# Patient Record
Sex: MALE | Race: WHITE
[De-identification: names, ages, dates, MRNs, and addresses within clinical notes are randomized per-mention and may not be internally consistent; named-entity substitution may affect disease eponyms.]

---

## 2020-01-19 ENCOUNTER — HOSPITAL ENCOUNTER (INPATIENT)
Dept: HOSPITAL 53 - M ED | Age: 45
LOS: 41 days | Discharge: HOME | DRG: 750 | End: 2020-02-29
Attending: PSYCHIATRY & NEUROLOGY | Admitting: PSYCHIATRY & NEUROLOGY
Payer: COMMERCIAL

## 2020-01-19 VITALS — SYSTOLIC BLOOD PRESSURE: 143 MMHG | DIASTOLIC BLOOD PRESSURE: 89 MMHG

## 2020-01-19 VITALS — SYSTOLIC BLOOD PRESSURE: 138 MMHG | DIASTOLIC BLOOD PRESSURE: 95 MMHG

## 2020-01-19 VITALS — HEIGHT: 71 IN | BODY MASS INDEX: 28.43 KG/M2 | WEIGHT: 203.05 LBS

## 2020-01-19 VITALS — SYSTOLIC BLOOD PRESSURE: 140 MMHG | DIASTOLIC BLOOD PRESSURE: 86 MMHG

## 2020-01-19 DIAGNOSIS — R53.83: ICD-10-CM

## 2020-01-19 DIAGNOSIS — R73.9: ICD-10-CM

## 2020-01-19 DIAGNOSIS — R21: ICD-10-CM

## 2020-01-19 DIAGNOSIS — R33.9: ICD-10-CM

## 2020-01-19 DIAGNOSIS — Z91.14: ICD-10-CM

## 2020-01-19 DIAGNOSIS — Z91.5: ICD-10-CM

## 2020-01-19 DIAGNOSIS — Z91.19: ICD-10-CM

## 2020-01-19 DIAGNOSIS — F10.10: ICD-10-CM

## 2020-01-19 DIAGNOSIS — Z81.8: ICD-10-CM

## 2020-01-19 DIAGNOSIS — F20.2: Primary | ICD-10-CM

## 2020-01-19 DIAGNOSIS — I10: ICD-10-CM

## 2020-01-19 RX ADMIN — PALIPERIDONE SCH MG: 3 TABLET, FILM COATED, EXTENDED RELEASE ORAL at 21:39

## 2020-01-20 VITALS — SYSTOLIC BLOOD PRESSURE: 138 MMHG | DIASTOLIC BLOOD PRESSURE: 80 MMHG

## 2020-01-20 VITALS — SYSTOLIC BLOOD PRESSURE: 128 MMHG | DIASTOLIC BLOOD PRESSURE: 72 MMHG

## 2020-01-20 VITALS — DIASTOLIC BLOOD PRESSURE: 74 MMHG | SYSTOLIC BLOOD PRESSURE: 134 MMHG

## 2020-01-20 VITALS — SYSTOLIC BLOOD PRESSURE: 150 MMHG | DIASTOLIC BLOOD PRESSURE: 86 MMHG

## 2020-01-20 VITALS — SYSTOLIC BLOOD PRESSURE: 140 MMHG | DIASTOLIC BLOOD PRESSURE: 91 MMHG

## 2020-01-20 LAB
ALBUMIN SERPL BCG-MCNC: 4.2 GM/DL (ref 3.2–5.2)
ALT SERPL W P-5'-P-CCNC: 25 U/L (ref 12–78)
B-OH-BUTYR SERPL-MCNC: 4.61 MG/DL (ref ?–2.81)
BASOPHILS # BLD AUTO: 0.1 10^3/UL (ref 0–0.2)
BASOPHILS NFR BLD AUTO: 0.6 % (ref 0–1)
BILIRUB SERPL-MCNC: 0.8 MG/DL (ref 0.2–1)
BUN SERPL-MCNC: 18 MG/DL (ref 7–18)
CALCIUM SERPL-MCNC: 9 MG/DL (ref 8.5–10.1)
CHLORIDE SERPL-SCNC: 110 MEQ/L (ref 98–107)
CO2 SERPL-SCNC: 24 MEQ/L (ref 21–32)
CREAT SERPL-MCNC: 1.26 MG/DL (ref 0.7–1.3)
EOSINOPHIL # BLD AUTO: 0.1 10^3/UL (ref 0–0.5)
EOSINOPHIL NFR BLD AUTO: 0.8 % (ref 0–3)
ETHANOL SERPL-MCNC: < 0.003 % (ref 0–0.01)
FT4I SERPL CALC-MCNC: 3.9 % (ref 1.4–3.8)
GFR SERPL CREATININE-BSD FRML MDRD: > 60 ML/MIN/{1.73_M2} (ref 60–?)
GLUCOSE SERPL-MCNC: 107 MG/DL (ref 70–100)
HCT VFR BLD AUTO: 52.5 % (ref 42–52)
HGB BLD-MCNC: 16.5 G/DL (ref 13.5–17.5)
LYMPHOCYTES # BLD AUTO: 2.5 10^3/UL (ref 1.5–5)
LYMPHOCYTES NFR BLD AUTO: 22.7 % (ref 24–44)
MAGNESIUM SERPL-MCNC: 2.1 MG/DL (ref 1.8–2.4)
MCH RBC QN AUTO: 28.3 PG (ref 27–33)
MCHC RBC AUTO-ENTMCNC: 31.4 G/DL (ref 32–36.5)
MCV RBC AUTO: 90.1 FL (ref 80–96)
MONOCYTES # BLD AUTO: 0.7 10^3/UL (ref 0–0.8)
MONOCYTES NFR BLD AUTO: 6.4 % (ref 0–5)
NEUTROPHILS # BLD AUTO: 7.7 10^3/UL (ref 1.5–8.5)
NEUTROPHILS NFR BLD AUTO: 69.2 % (ref 36–66)
OSMOLALITY SERPL: 300 MOSM/KG (ref 275–295)
PLATELET # BLD AUTO: 146 10^3/UL (ref 150–450)
POTASSIUM SERPL-SCNC: 3.9 MEQ/L (ref 3.5–5.1)
PROT SERPL-MCNC: 8.1 GM/DL (ref 6.4–8.2)
RBC # BLD AUTO: 5.83 10^6/UL (ref 4.3–6.1)
SODIUM SERPL-SCNC: 144 MEQ/L (ref 136–145)
T3RU NFR SERPL: 36 % (ref 33–40)
T4 SERPL-MCNC: 10.7 UG/DL (ref 4.5–12)
WBC # BLD AUTO: 11.1 10^3/UL (ref 4–10)

## 2020-01-20 RX ADMIN — PALIPERIDONE SCH MG: 3 TABLET, FILM COATED, EXTENDED RELEASE ORAL at 09:12

## 2020-01-20 RX ADMIN — NICOTINE SCH PATCH: 21 PATCH, EXTENDED RELEASE TRANSDERMAL at 09:00

## 2020-01-20 NOTE — IPNPDOC
Text Note


Date of Service


The patient was seen on 1/20/20.





NOTE


TIME OF SERVICE: 1:30 PM





Mr. Lomeli is a 44-year-old male with a past medical history of schizophrenia, 

and depression with a previous suicide attempt who was transferred from [facility] for management of depression with suicidal ideation





SUBJECTIVE:


Per discussion with the nursing staff. The patient ate his breakfast and lunch 

very well, but has not urinated or defecated. Bladder scan revealed retention of

300 mils of urine. The patient denies having any pain or any acute problems  

this afternoon





OBJECTIVE:


GEN: well-nourished / well developed / reclined in hospital bed 


HEENT: NCAT 


CVS: RRR/NMRG


LUNGS:  lungs are clear to auscultation bilaterally on room air


ABDOMEN:  Contour ( distended) / bowel sounds are present /  the abdomen is firm

but the patient does not grimace with palpation


PSYCH: He has a flat affect and psychomotor slowing / his verbal responses slow





ASSESEMENT & PLAN:


1. Depression/suicidal ideation.


Plan: per primary.





2. Lethargy


Possible catatonia versus adverse reaction to her medication


Plan: Continue to monitor vitals /  Ativan per primary team





3. Urinary and fecal retention 


Plan: Follow up repeat bladder scan w intermittent straight cath if >300ml / 

will consider starting tamsulosin /  if he does not have a bowel movement  t

omorrow we will start a stool softerner 





4. Essential Hypertension


His target blood pressure at his age is 120/80 or less.


Plan: Because his target systolic blood pressure is greater than 20 millimeters 

mercury above the target we will start amlodipine 2.5 mg daily





5. Hyperglycemia.


Beta hydroxybutyrate and osmolality were elevated, but the tox screen was 

negative for ethyl alcohol.


Ethylene glycol is negative.


The anion gap was within normal limits


Plan: f/u A1C to r/o DM








DISPO pending course





VS,Fishbone, I+O


VS, Fishbone, I+O


Laboratory Tests


1/20/20 00:00











Vital Signs








  Date Time  Temp Pulse Resp B/P (MAP) Pulse Ox O2 Delivery O2 Flow Rate FiO2


 


1/20/20 10:37  80 17 150/86 (107) 96   


 


1/20/20 06:22 98.2       


 


1/20/20 03:53      Room Air  














I&O- Last 24 Hours up to 6 AM 


 


 1/20/20





 06:00


 


Output Total 0 ml


 


Balance 0 ml

















JANETH ALVAREZ MD                Jan 20, 2020 14:16

## 2020-01-20 NOTE — MHHPEPDOC
[facility] History & Physical


History and Physical


DATE OF ADMISSION: Jan 19, 2020 at 15:08








New Patient


Rik Pringle


MRN: N/[id number]


Date of Birth: N/A


Date of Service: 01/20/2020


Chief Complaint


"..."





History of Present Illness


The patient a 44-year-old man who was transferred from [facility], is brought

to our inpatient unit due to bizarre behavior. He was brought in initially to 

[place] due to significant bizarre actions such as going into a neighbor's house 

and becoming mute, shaking backwards and forwards, engaging in unusual behavior.

He was brought in and assessed where he was brought in for admission. He 

reportedly has a diagnosis of schizophrenia, however, he is completely catatonic

at this time and unable to engage in any meaningful interview other than repeat 

the words "I want to go home." Thus, the information below was taken from the 

chart and extracted as appropriate.





Review Of Systems


Unable to determine due to patient's significant impairment.





Past Psychiatric History


Reportedly has a history of schizophrenia with multiple admissions to [facility] in the past. Per chart appears to report that he 

has had multiple suicide attempts 5 or 6 times by stabbing.





Allergies


Please see below.





Family Psychiatric History


Reportedly has a sister and a niece that has attempted suicide, but no history 

of substance problems reported.





Social History


The patient is a never  man who is single with no children. He reports 

being employed at [employer], graduated the 10th grade. Currently living with a 

sibling by report.





Substance Abuse History


No toxicology taken, unclear if utilizes any significant substances.





Medical History


No major medical problems noted.





Mental Status Examination


General: Poor hygiene





Speech: Mute





Thought processes: Unknown





MSK: Smooth and coordinated gait, no signs of tremors or involuntary orofacial 

movements





Thought content: Unknown





Abstract reasoning, and computation: Impaired





Description of associations: Unknown





Description of abnormal or psychotic thoughts: Not available





Judgment: Impaired





Insight: Impaired





Orientation: Appears alert, but does not appear to interact significantly with 

this provider





Cognition: Impaired





Recent and remote memory: Unable to determine





Attention span and concentration: Impaired secondary to thought process versus 

cognition





Fund of knowledge: Unknown





Mood: "I want to go home"





Affect: Flat with little reactivity





Diagnoses


Schizophrenia





Catatonia





Alcohol use disorder, unspecified





Assessment and Plan


Schizophrenia: We'll discontinue Invega as likely provoking more catatonia, 

we'll continue Ativan 1 mg every 6 hours PO





Catatonia: Ativan as above. We'll continue to monitor





Alcohol use disorder: No signs of significant problems with vital signs, 

however, Ativan scheduled will cover for any alcohol withdrawal. Negative BAL on

admission to the unit





Disposition


The patient will need an admission further for a significantly impairing 

psychosis. He is unable to interact well with any provider or other in order to 

engage in basic hygiene. He'll need to continue on a 1-1 due to his balance 

problems.





Problem List


1. Altered thoughts.





Initial Treatment Plan


1. Patient was admitted on a 9.39 legal status. 


2. Complete history was obtained.


3. With patients permission, family will be contacted and database will be exp

anded. 


4. Patients medication regimen will be reviewed and changed accordingly. 


5. Patient will be provided with protected environment. 


6. Patient will be treated with individual, group, and milieu therapies. 


7. Patient will receive supportive psych-education.


8. Discharge planning will commence immediately.


9. Outpatient follow-up treatment will be strongly recommended.


10. The initial treatment plan will focus initially on:





Estimated Length Of Stay


5 days.





Time Spent


70 minutes.





Monday





Vital Signs





Vital Signs








  Date Time  Temp Pulse Resp B/P (MAP) Pulse Ox O2 Delivery O2 Flow Rate FiO2


 


1/20/20 06:22 98.2 50 18 128/72 (90)    


 


1/20/20 03:53     96 Room Air  











Laboratory Data


24H Labs


Laboratory Tests 2


1/20/20 00:00: 


Immature Granulocyte % (Auto) 0.3, Neutrophils (%) (Auto) 69.2H, Lymphocytes (%)

(Auto) 22.7L, Monocytes (%) (Auto) 6.4H, Eosinophils (%) (Auto) 0.8, Basophils 

(%) (Auto) 0.6, Neutrophils # (Auto) 7.7, Lymphocytes # (Auto) 2.5, Monocytes # 

(Auto) 0.7, Eosinophils # (Auto) 0.1, Basophils # (Auto) 0.1, Nucleated Red 

Blood Cells % (auto) 0.0, Anion Gap 10, Glomerular Filtration Rate > 60.0, 

Osmolality 300H, Lactic Acid Level 1.5, Calcium Level 9.0, Magnesium Level 2.1, 

Total Bilirubin 0.8, Aspartate Amino Transf (AST/SGOT) 12, Alanine 

Aminotransferase (ALT/SGPT) 25, Alkaline Phosphatase 114, Total Protein 8.1, 

Albumin 4.2, Albumin/Globulin Ratio 1.08, Thyroid Stimulating Hormone (TSH) 

5.110H, Free Thyroxine Index 3.9H, Thyroxine (T4) 10.7, Triiodothyronine (T3) 

Uptake 36, Ethyl Alcohol Level < 0.003, B-Hydroxybutyrate 4.61H


CBC/BMP


Laboratory Tests


1/20/20 00:00











Medications


No Active Prescriptions or Reported Meds





Allergies


Coded Allergies:  


     No Known Allergies (Unverified , 1/19/20)











FRANCESCO GERMAIN DO              Jan 20, 2020 10:22

## 2020-01-20 NOTE — HPEPDOC
General


Date of Admission


Jan 19, 2020 at 15:08


Date of Service:  Jan 19, 2020


Chief Complaint


The patient is a 44-year-old male Who presented to [facility] as a transfer from [facility] for suicidal thoughts.





History of Present Illness


Patient is a 44 year old  male with a PMHX of Schizophrenia, 

Depression, Hx of Suicidal attempt (Reported to be with Antifreeze consumption) 

who presented to the [facility] ER as a transfer from [facility] after he was found 

to have suicidal thoughts.


 


Patient presented to [facility] and reported he had a sick mind and had thoughts / 

voices of self-harm. He noted to staff there that he wanted to kill himself with

knives. He was evaluated by medicine there and was transferred to our facility 

for psychiatric inpatient hospitalization. 


 


I was called to evaluate the patient after staff reported that he was noted to 

have possible lethargy and catatonia. 


 


Upon evaluation of the patient reported that he still has thoughts of self-harm,

but had no specific complaints. Patient appears to be very poor historian. The 

details of his story do not match what has transpired prior to his arrival.


 


He denied any chest pain, SOB, palpitations, cough, N/V, abdominal pain, C/D or 

dysuria. Denied fevers/chills.





Home Medications


No Active Prescriptions or Reported Meds





Allergies


Coded Allergies:  


     No Known Allergies (Unverified , 1/19/20)





Past Medical History


Medical History


Schizophrenia, Depression, Hx of Suicidal attempt (Reported to be with 

Antifreeze consumption)


Surgical History


Patient has denied any prior surgeries; however his history is unverifiable





Family History


- Patient has denied any prior family history; however again, this remains 

unverifiable





Social History


- Denies the use of alcohol, tobacco or illicit drugs


- Transfer from [facility] for psychosis and suicidal ideation





Review of Systems


Other systems


10 point review of systems complete, all negative otherwise stated in HPI





Vital Signs


- Vitals: /86, HR 64, RR 16, Sat 95%RA, Temp 97.8F


- General: Lying in bed, Limited speech Awake / Alert / Oriented to person only,

Flat affect, No eye contact


- HEENT: NC, AT, PERRLA


- CVS: RRR, +S1S2, - Murmurs / rubs / gallops


- Lungs: Fair air entry bilaterally, No appreciable wheezing / rales / rhonchi 


- Abdomen: Soft, Non-distended, Non-tender, 


- Extremities: No lower extremity edema, No calf tenderness


- Neuro: No focal motor or sensory deficit


- Skin: No visible rashes





Laboratory Data


Labs 24H


Laboratory Tests 2


1/20/20 00:00: 


Immature Granulocyte % (Auto) 0.3, Neutrophils (%) (Auto) 69.2H, Lymphocytes (%)

(Auto) 22.7L, Monocytes (%) (Auto) 6.4H, Eosinophils (%) (Auto) 0.8, Basophils 

(%) (Auto) 0.6, Neutrophils # (Auto) 7.7, Lymphocytes # (Auto) 2.5, Monocytes # 

(Auto) 0.7, Eosinophils # (Auto) 0.1, Basophils # (Auto) 0.1, Nucleated Red 

Blood Cells % (auto) 0.0, Anion Gap 10, Glomerular Filtration Rate > 60.0, 

Osmolality 300H, Lactic Acid Level 1.5, Calcium Level 9.0, Magnesium Level 2.1, 

Total Bilirubin 0.8, Aspartate Amino Transf (AST/SGOT) 12, Alanine 

Aminotransferase (ALT/SGPT) 25, Alkaline Phosphatase 114, Total Protein 8.1, 

Albumin 4.2, Albumin/Globulin Ratio 1.08, Thyroid Stimulating Hormone (TSH) 

5.110H, Free Thyroxine Index 3.9H, Thyroxine (T4) 10.7, Triiodothyronine (T3) 

Uptake 36, Ethyl Alcohol Level < 0.003, B-Hydroxybutyrate 4.61H


CBC/BMP


Laboratory Tests


1/20/20 00:00











Plan / VTE


VTE Prophylaxis Ordered?:  Yes





Plan


Plan


Suicidal ideation / Schizophrenia 


- Patient presented as a transfer from [facility] for suicidal ideatio

n/psychosis


- Patient was evaluated by medical personnel at [facility] and cleared for transfer to

some [facility]


- Patient was subsequently brought to [facility] ER and admitted to [facility] by Dr. Main


- This is currently being managed by psychiatry





Lethargy - possibly 2/2 medications received, possibly 2/2 consumption of other 


- As called by [facility] you to evaluate patient for lethargy/catatonia


- Currently patient appears to be hemodynamically stable and afebrile


- Patient is not fully oriented; remains oriented only to person


- Lab work completed at [facility] did reveal an elevation of his anion gap (21), 

suppression of bicarbonate (19), mild elevation of WBC (11. 7), osmolality 

(289), UA was negative for infection - but did reveal 3+ Ketones 


- Will repeat lab work here today; CBC / CMP / Mg / Osmolality / Lactic acid / 

Ethylene glycol / Acetate / Ketones / Ethanol 





DVT prophylaxis 


- Will c/w early ambulation 





Chaperone was present for the duration of his history and physical examination





Will continue to follow











PINO NAJERA MD                Jan 20, 2020 01:48

## 2020-01-21 VITALS — SYSTOLIC BLOOD PRESSURE: 146 MMHG | DIASTOLIC BLOOD PRESSURE: 88 MMHG

## 2020-01-21 VITALS — DIASTOLIC BLOOD PRESSURE: 88 MMHG | SYSTOLIC BLOOD PRESSURE: 135 MMHG

## 2020-01-21 VITALS — SYSTOLIC BLOOD PRESSURE: 142 MMHG | DIASTOLIC BLOOD PRESSURE: 90 MMHG

## 2020-01-21 VITALS — DIASTOLIC BLOOD PRESSURE: 65 MMHG | SYSTOLIC BLOOD PRESSURE: 105 MMHG

## 2020-01-21 LAB — EST. AVERAGE GLUCOSE BLD GHB EST-MCNC: 103 MG/DL (ref 60–110)

## 2020-01-21 RX ADMIN — NICOTINE SCH PATCH: 21 PATCH, EXTENDED RELEASE TRANSDERMAL at 08:31

## 2020-01-21 NOTE — MHIPNPDOC
[facility] Progress Note


Progress Note








Inpatient Progress Note


Rik Pringle


MRN: N/A


Date of Birth: N/A


Date of Service: 01/21/2020


History of Present Illness


Patient presented catatonic with a history of schizophrenia and significant 

mental health transfer from [place].





Interval History


Psychiatric symptoms today:





Affective: Unable to determine.





Psychotic: Significant catatonic behavior with strange emotions, has less 

problems with eating more oral intake than previous. Converses in more than 1 

sentence at a time.





Anxiety: Unable to screen.





Misc: Still disorganized behavior.





Group Attendance: No groups gone to today.





Behavioral problems/significant events overnight: The patient reports he wants 

to go "home," however he requires a sitter due to his balance. He has had no 

falls but continues to be disorganized.





Staff Report: The patient continues to be disorganized, but has been improving 

in terms of his catatonic behavior. He still says only a few words at a time, 

but appears slightly more interactive.





Review Of Systems


Unable to determine due to patient's mental status.





Psychotherapy


None on this visit.





Vital Signs


Reviewed.





Mental Status Examination


General: Fair hygiene





Speech: Nearly mute





Thought processes: Appears linear





MSK: Psychomotor retardation





Thought content: Unknown





Abstract reasoning, and computation: Appears impaired





Description of associations: Appears impaired





Description of abnormal or psychotic thoughts: Unknown





Judgment: impaired





Insight: impaired





Orientation: Appears alert and able to interact





Cognition: Slowed





Recent and remote memory: Impaired





Attention span and concentration: Impaired





Fund of knowledge: Unknown





Mood: "I want to go home"





Affect: Profoundly flat and dysthymic





Diagnoses


Schizophrenia.





Catatonia.





Alcohol use disorder, unspecified.





Assessment and Plan


Schizophrenia: Will continue to hold off on neuroleptics until catatonia 

resolves.





Catatonia: Continue Ativan.





Alcohol use disorder: Will continue Ativan for catatonia. Continue to screen for

any dysregulation consistent with alcohol withdrawal, as unknown amount of 

alcohol use prior.





Disposition


The patient will need a further inpatient admission due to his severe impairing 

psychosis and catatonia, making him unable to care for himself.





Time Spent


15 minutes.





Tuesday





Vital Signs





Vital Signs








  Date Time  Temp Pulse Resp B/P (MAP) Pulse Ox O2 Delivery O2 Flow Rate FiO2


 


1/21/20 08:30  84  140/90    


 


1/21/20 06:44 97.3  14  95 Room Air  











Current Medications





Current Medications








 Medications


  (Trade)  Dose


 Ordered  Sig/Nimo


 Route


 PRN Reason  Start Time


 Stop Time Status Last Admin


Dose Admin


 


 Acetaminophen


  (Tylenol Tab)  650 mg  Q6HP  PRN


 PO


 HEADACHE or DISCOMFORT  1/19/20 15:15


     





 


 Al Hydrox/Mg


 Hydrox/Simethicone


  (Mylanta)  30 ml  Q4HP  PRN


 PO


 HEARTBURN/INDIGESTION  1/19/20 15:15


     





 


 Amlodipine


 Besylate


  (Norvasc)  2.5 mg  DAILY


 PO


   1/20/20 09:00


 1/21/20 08:37 DC 1/21/20 08:30





 


 Amlodipine


 Besylate


  (Norvasc)  5 mg  DAILY


 PO


   1/22/20 09:00


     





 


 Home Med


  (Med Rec


 Complete!)    ASDIRECTED


 XX


   1/19/20 12:45


 1/19/20 12:44 DC  





 


 Lorazepam


  (Ativan)  0.5 mg  TID


 PO


   1/20/20 16:00


 1/20/20 13:08 DC  





 


 Lorazepam


  (Ativan)  1 mg  Q6H


 PO


   1/19/20 22:00


 1/20/20 13:09 DC 1/20/20 10:33





 


 Lorazepam


  (Ativan)  1 mg  Q6H


 PO


   1/20/20 18:00


    1/21/20 06:05





 


 Magnesium


 Hydroxide


  (Milk Of


 Magnesia)  30 ml  DAILYPRN  PRN


 PO


 CONSTIPATION  1/19/20 15:15


     





 


 Nicotine


  (Nicoderm Cq


 21mg)  1 patch  DAILY


 TD


   1/20/20 09:00


     





 


 Olanzapine


  (ZyPREXA


 **ZYDIS**)  10 mg  TIDP  PRN


 PO


 ANXIETY/AGITATION  1/19/20 15:00


    1/19/20 21:39





 


 Paliperidone


  (Invega)  3 mg  BID


 PO


   1/19/20 21:00


 1/20/20 12:58 DC 1/20/20 09:12





 


 Trazodone HCl


  (Desyrel)  50 mg  QHSP  PRN


 PO


 INSOMNIA  1/19/20 21:00


    1/19/20 21:39














Allergies


Coded Allergies:  


     No Known Allergies (Unverified , 1/19/20)











FRANCESCO GERMAIN DO              Jan 21, 2020 09:27

## 2020-01-21 NOTE — IPNPDOC
Text Note


Date of Service


The patient was seen on 1/21/20.





NOTE


TIME OF SERVICE: 1205 PM





Mr. Lomeli is a 44-year-old male with a past medical history of schizophrenia, 

and depression with a previous suicide attempt who was transferred from [facility] for management of depression with suicidal ideation; we were consulted 

to comanage his medical problems





SUBJECTIVE:


Per discussion with the nursing staff , the patient voided and his catatonia is 

resolving. Currently the patient denies having any acute complaints





OBJECTIVE:


GEN: well-nourished / well developed / reclined in hospital bed 


HEENT: NCAT 


PSYCH: He has a flat affect and psychomotor slowing is less prominent today than

it was yesterday / his verbal response is not as low as it was yesterday





ASSESEMENT & PLAN:


1. Depression/suicidal ideation.


Plan: per primary.





2. Catatonia- resolving


Plan:  per primary team





3. Urinary and fecal retention - resolving





4. Essential Hypertension


His blood pressure still suboptimally controlled


Plan: Increase amlodipine to 5 mg daily





5. Transient Hyperglycemia.


His A1c was 5.2





Thank you for consulting us, we will sign off, please don't hesitate to contact 

us if new issues arise.





VS,Fishbone, I+O


VS, Fishbone, I+O





Vital Signs








  Date Time  Temp Pulse Resp B/P (MAP) Pulse Ox O2 Delivery O2 Flow Rate FiO2


 


1/21/20 12:00  80 16 135/88 (104) 98   


 


1/21/20 06:44 97.3     Room Air  














I&O- Last 24 Hours up to 6 AM 


 


 1/21/20





 06:00


 


Output Total 1050 ml


 


Balance -1050 ml

















JANETH ALVAREZ MD                Jan 21, 2020 14:58

## 2020-01-22 VITALS — DIASTOLIC BLOOD PRESSURE: 59 MMHG | SYSTOLIC BLOOD PRESSURE: 118 MMHG

## 2020-01-22 VITALS — SYSTOLIC BLOOD PRESSURE: 141 MMHG | DIASTOLIC BLOOD PRESSURE: 80 MMHG

## 2020-01-22 VITALS — DIASTOLIC BLOOD PRESSURE: 73 MMHG | SYSTOLIC BLOOD PRESSURE: 133 MMHG

## 2020-01-22 VITALS — SYSTOLIC BLOOD PRESSURE: 133 MMHG | DIASTOLIC BLOOD PRESSURE: 73 MMHG

## 2020-01-22 VITALS — SYSTOLIC BLOOD PRESSURE: 125 MMHG | DIASTOLIC BLOOD PRESSURE: 68 MMHG

## 2020-01-22 RX ADMIN — NICOTINE SCH PATCH: 21 PATCH, EXTENDED RELEASE TRANSDERMAL at 08:21

## 2020-01-22 NOTE — MHIPNPDOC
[facility] Progress Note


Progress Note








Inpatient Progress Note


Rik Pringle


MRN: N/A


Date of Birth: N/A


Date of Service: 01/22/2020


History of Present Illness


Patient presented catatonic with a history of schizophrenia and significant 

mental health transfer from home.





Interval History


Psychiatric symptoms today:





Affective: Unable to determine.





Psychotic: Still psychotic behavior in the terms of disorganization, inability 

to care for self, no bathing, does take significant prompting to eat.





Anxiety: Unable to screen.





Misc: Still disorganized behavior.





Group Attendance: No groups gone to today.





Behavioral problems/significant events overnight: The patient reports he wants 

to go "home," however he requires a sitter due to his balance. He has had no 

falls but continues to be disorganized.





Staff Report: The patient continues to be catatonic and unable to care for 

himself. His disorganization makes it difficult to coordinate feeding.





Review Of Systems


General: Denies fever or appetite changes 





Cardiovascular: Denies Chest pain or palpations





GI: Denies Nausea, vomiting, or bowel changes





Respiratory: Denies shortness of breath or cough





Neuro: Denies dizziness, tremors





Derm: Denies any rashes or pruritus





: Denies any dysuria or urinary problems 





MSK: Denies any muscle tightness or stiffness





HEENT: Denies any vision changes or headaches





Heme/Lymph: denies any bruising or bleeding





Endo: denies any cold/heat intolerance or water intake changes





Psychotherapy


None on this visit.





Vital Signs


Reviewed.





Mental Status Examination


General: Fair hygiene





Speech: Nearly mute





Thought processes: Appears linear





MSK: Psychomotor retardation





Thought content: Unknown





Abstract reasoning, and computation: Appears impaired





Description of associations: Appears impaired





Description of abnormal or psychotic thoughts: Unknown





Judgment: impaired





Insight: impaired





Orientation: Appears alert and able to interact





Cognition: Slowed





Recent and remote memory: Impaired





Attention span and concentration: Impaired





Fund of knowledge: Unknown





Mood: "I want to go home"





Affect: Profoundly flat and dysthymic





Diagnoses


Schizophrenia.





Catatonia.





Alcohol use disorder, unspecified.





Assessment and Plan


Schizophrenia: Will try a small trial of Clozaril 25 mg with spacing for Ativan.





Catatonia: Continue Ativan.





Alcohol use disorder: Will continue Ativan for catatonia. Continue to screen for

any dysregulation consistent with alcohol withdrawal, as unknown amount of 

alcohol use prior.





Disposition


The patient will need a further inpatient admission due to his severe impairing 

psychosis and catatonia, making him unable to care for himself.





Time Spent


15 minutes.





Wednesday





Vital Signs





Vital Signs








  Date Time  Temp Pulse Resp B/P (MAP) Pulse Ox O2 Delivery O2 Flow Rate FiO2


 


1/22/20 06:02 97.9 62 16 141/80 (100)    


 


1/22/20 00:14     100 Room Air  











Current Medications





Current Medications








 Medications


  (Trade)  Dose


 Ordered  Sig/Nimo


 Route


 PRN Reason  Start Time


 Stop Time Status Last Admin


Dose Admin


 


 Acetaminophen


  (Tylenol Tab)  650 mg  Q6HP  PRN


 PO


 HEADACHE or DISCOMFORT  1/19/20 15:15


     





 


 Al Hydrox/Mg


 Hydrox/Simethicone


  (Mylanta)  30 ml  Q4HP  PRN


 PO


 HEARTBURN/INDIGESTION  1/19/20 15:15


     





 


 Amlodipine


 Besylate


  (Norvasc)  2.5 mg  DAILY


 PO


   1/20/20 09:00


 1/21/20 08:37 DC 1/21/20 08:30





 


 Amlodipine


 Besylate


  (Norvasc)  5 mg  DAILY


 PO


   1/22/20 09:00


     





 


 Home Med


  (Med Rec


 Complete!)    ASDIRECTED


 XX


   1/19/20 12:45


 1/19/20 12:44 DC  





 


 Lorazepam


  (Ativan)  0.5 mg  TID


 PO


   1/20/20 16:00


 1/20/20 13:08 DC  





 


 Lorazepam


  (Ativan)  1 mg  Q6H


 PO


   1/19/20 22:00


 1/20/20 13:09 DC 1/20/20 10:33





 


 Lorazepam


  (Ativan)  1 mg  Q6H


 PO


   1/20/20 18:00


    1/22/20 05:51





 


 Magnesium


 Hydroxide


  (Milk Of


 Magnesia)  30 ml  DAILYPRN  PRN


 PO


 CONSTIPATION  1/19/20 15:15


     





 


 Nicotine


  (Nicoderm Cq


 21mg)  1 patch  DAILY


 TD


   1/20/20 09:00


     





 


 Olanzapine


  (ZyPREXA


 **ZYDIS**)  10 mg  TIDP  PRN


 PO


 ANXIETY/AGITATION  1/19/20 15:00


 1/21/20 15:16 DC 1/19/20 21:39





 


 Paliperidone


  (Invega)  3 mg  BID


 PO


   1/19/20 21:00


 1/20/20 12:58 DC 1/20/20 09:12





 


 Trazodone HCl


  (Desyrel)  50 mg  QHSP  PRN


 PO


 INSOMNIA  1/19/20 21:00


    1/19/20 21:39














Allergies


Coded Allergies:  


     No Known Allergies (Unverified , 1/19/20)











FRANCESCO GERMAIN DO              Jan 22, 2020 07:27

## 2020-01-23 VITALS — SYSTOLIC BLOOD PRESSURE: 110 MMHG | DIASTOLIC BLOOD PRESSURE: 72 MMHG

## 2020-01-23 VITALS — SYSTOLIC BLOOD PRESSURE: 136 MMHG | DIASTOLIC BLOOD PRESSURE: 76 MMHG

## 2020-01-23 VITALS — SYSTOLIC BLOOD PRESSURE: 103 MMHG | DIASTOLIC BLOOD PRESSURE: 55 MMHG

## 2020-01-23 VITALS — DIASTOLIC BLOOD PRESSURE: 87 MMHG | SYSTOLIC BLOOD PRESSURE: 138 MMHG

## 2020-01-23 LAB
ALBUMIN SERPL BCG-MCNC: 3.3 GM/DL (ref 3.2–5.2)
ALT SERPL W P-5'-P-CCNC: 36 U/L (ref 12–78)
BILIRUB SERPL-MCNC: 0.6 MG/DL (ref 0.2–1)
BUN SERPL-MCNC: 13 MG/DL (ref 7–18)
CALCIUM SERPL-MCNC: 8.3 MG/DL (ref 8.5–10.1)
CHLORIDE SERPL-SCNC: 112 MEQ/L (ref 98–107)
CO2 SERPL-SCNC: 24 MEQ/L (ref 21–32)
CREAT SERPL-MCNC: 1.01 MG/DL (ref 0.7–1.3)
GFR SERPL CREATININE-BSD FRML MDRD: > 60 ML/MIN/{1.73_M2} (ref 60–?)
GLUCOSE SERPL-MCNC: 75 MG/DL (ref 70–100)
POTASSIUM SERPL-SCNC: 4.1 MEQ/L (ref 3.5–5.1)
PROT SERPL-MCNC: 6.6 GM/DL (ref 6.4–8.2)
SODIUM SERPL-SCNC: 143 MEQ/L (ref 136–145)

## 2020-01-23 RX ADMIN — NICOTINE SCH PATCH: 21 PATCH, EXTENDED RELEASE TRANSDERMAL at 09:00

## 2020-01-23 NOTE — MHIPNPDOC
[facility] Progress Note


Progress Note








Inpatient Progress Note


Rik Pringle


MRN: N/A


Date of Birth: N/A


Date of Service: 01/23/2020


History of Present Illness


Patient presented catatonic with a history of schizophrenia and significant 

mental health transfer from home.





Interval History


Psychiatric symptoms today:





Affective: Unable to determine.





Psychotic: The patient is still having significant psychotic behavior, appears 

to be more catatonic, more frozen and repeating words, unable to engage in basic

behaviors.





Anxiety: Unable to screen.





Misc: Still disorganized behavior.





Group Attendance: No groups gone to today.





Behavioral problems/significant events overnight: The patient reports he wants 

to go "home," however he requires a sitter due to his balance. He has had no 

falls but continues to be disorganized.





Staff Report: The patient continues to be catatonic and unable to care for 

himself. His disorganization makes it difficult to coordinate feeding.





Review Of Systems


Unable to obtain due to mental status.





Psychotherapy


None on this visit.





Vital Signs


Reviewed.





Mental Status Examination


General: Poor hygiene





Speech: Nearly mute





Thought processes: Appears linear





MSK: Psychomotor retardation





Thought content: Unknown





Abstract reasoning, and computation: Appears impaired





Description of associations: Appears impaired





Description of abnormal or psychotic thoughts: Unknown





Judgment: impaired





Insight: impaired





Orientation: Appears alert and able to interact





Cognition: Slowed





Recent and remote memory: Impaired





Attention span and concentration: Impaired





Fund of knowledge: Unknown





Mood: "I want to go home"





Affect: Profoundly flat and dysthymic





Diagnoses


Schizophrenia.





Catatonia.





Alcohol use disorder, unspecified.





Assessment and Plan


Schizophrenia: Discontinue Clozaril 25 mg as appears to make patient worse. 

Continue Ativan.





Catatonia: Will do CMP which appears normal and urinalysis to monitor for rhabdo

and other problems consistent with catatonia and starvation.





Alcohol use disorder: Will continue Ativan for catatonia. Continue to screen for

any dysregulation consistent with alcohol withdrawal, as unknown amount of 

alcohol use prior.





Disposition


The patient will need a further inpatient admission due to his severe impairing 

psychosis and catatonia, making him unable to care for himself.





Time Spent


15 minutes.





Thursday





Vital Signs





Vital Signs








  Date Time  Temp Pulse Resp B/P (MAP) Pulse Ox O2 Delivery O2 Flow Rate FiO2


 


1/23/20 09:36  66 16 110/72 (85) 97   


 


1/23/20 06:31 97.5     Room Air  











Current Medications





Current Medications








 Medications


  (Trade)  Dose


 Ordered  Sig/Nimo


 Route


 PRN Reason  Start Time


 Stop Time Status Last Admin


Dose Admin


 


 Acetaminophen


  (Tylenol Tab)  650 mg  Q6HP  PRN


 PO


 HEADACHE or DISCOMFORT  1/19/20 15:15


     





 


 Al Hydrox/Mg


 Hydrox/Simethicone


  (Mylanta)  30 ml  Q4HP  PRN


 PO


 HEARTBURN/INDIGESTION  1/19/20 15:15


     





 


 Amlodipine


 Besylate


  (Norvasc)  2.5 mg  DAILY


 PO


   1/20/20 09:00


 1/21/20 08:37 DC 1/21/20 08:30





 


 Amlodipine


 Besylate


  (Norvasc)  5 mg  DAILY


 PO


   1/22/20 09:00


    1/22/20 08:20





 


 Clozapine


  (Clozaril)  25 mg  QHS


 PO


   1/22/20 21:00


    1/22/20 21:33





 


 Home Med


  (Med Rec


 Complete!)    ASDIRECTED


 XX


   1/19/20 12:45


 1/19/20 12:44 DC  





 


 Lorazepam


  (Ativan)  0.5 mg  TID


 PO


   1/20/20 16:00


 1/20/20 13:08 DC  





 


 Lorazepam


  (Ativan)  1 mg  Q6H


 PO


   1/19/20 22:00


 1/20/20 13:09 DC 1/20/20 10:33





 


 Lorazepam


  (Ativan)  1 mg  Q6H


 PO


   1/20/20 18:00


 1/22/20 08:26 DC 1/22/20 05:51





 


 Lorazepam


  (Ativan)  1 mg  TID


 PO


   1/22/20 16:00


    1/23/20 09:38





 


 Magnesium


 Hydroxide


  (Milk Of


 Magnesia)  30 ml  DAILYPRN  PRN


 PO


 CONSTIPATION  1/19/20 15:15


     





 


 Nicotine


  (Nicoderm Cq


 21mg)  1 patch  DAILY


 TD


   1/20/20 09:00


     





 


 Olanzapine


  (ZyPREXA


 **ZYDIS**)  10 mg  TIDP  PRN


 PO


 ANXIETY/AGITATION  1/19/20 15:00


 1/21/20 15:16 DC 1/19/20 21:39





 


 Paliperidone


  (Invega)  3 mg  BID


 PO


   1/19/20 21:00


 1/20/20 12:58 DC 1/20/20 09:12





 


 Trazodone HCl


  (Desyrel)  50 mg  QHSP  PRN


 PO


 INSOMNIA  1/19/20 21:00


    1/19/20 21:39














Allergies


Coded Allergies:  


     No Known Allergies (Unverified , 1/19/20)











FRANCESCO GERMAIN DO              Jan 23, 2020 10:53

## 2020-01-24 VITALS — DIASTOLIC BLOOD PRESSURE: 78 MMHG | SYSTOLIC BLOOD PRESSURE: 134 MMHG

## 2020-01-24 VITALS — DIASTOLIC BLOOD PRESSURE: 64 MMHG | SYSTOLIC BLOOD PRESSURE: 115 MMHG

## 2020-01-24 VITALS — SYSTOLIC BLOOD PRESSURE: 132 MMHG | DIASTOLIC BLOOD PRESSURE: 77 MMHG

## 2020-01-24 LAB
APPEARANCE UR: CLEAR
BACTERIA UR QL AUTO: NEGATIVE
BILIRUB UR QL STRIP.AUTO: NEGATIVE
GLUCOSE UR QL STRIP.AUTO: NEGATIVE MG/DL
HGB UR QL STRIP.AUTO: NEGATIVE
KETONES UR QL STRIP.AUTO: NEGATIVE MG/DL
LEUKOCYTE ESTERASE UR QL STRIP.AUTO: NEGATIVE
MUCOUS THREADS URNS QL MICRO: (no result)
NITRITE UR QL STRIP.AUTO: NEGATIVE
PH UR STRIP.AUTO: 6 UNITS (ref 5–9)
PROT UR QL STRIP.AUTO: NEGATIVE MG/DL
RBC # UR AUTO: 2 /HPF (ref 0–3)
SP GR UR STRIP.AUTO: 1.02 (ref 1–1.03)
SQUAMOUS #/AREA URNS AUTO: 0 /HPF (ref 0–6)
UROBILINOGEN UR QL STRIP.AUTO: 2 MG/DL (ref 0–2)
WBC #/AREA URNS AUTO: 1 /HPF (ref 0–3)

## 2020-01-24 RX ADMIN — NICOTINE SCH PATCH: 21 PATCH, EXTENDED RELEASE TRANSDERMAL at 09:00

## 2020-01-24 NOTE — MHIPNPDOC
[facility] Progress Note


Progress Note








Inpatient Progress Note


Rik Pringle


MRN: N/A


Date of Birth: N/A


Date of Service: 01/24/2020


History of Present Illness


Patient presented catatonic with a history of schizophrenia and significant 

mental health transfer from home.





Interval History


Psychiatric symptoms today: The patient makes no effort to respond other than 

saying "I want to go home," stares off blankly, nearly mute.





Affective: Unable to determine.





Psychotic: The patient still having very disorganized behavior but has made some

improvements in showering, has been eating more, although repeats various words,

is generally negativistic.





Anxiety: Unable to screen.





Misc: Still has some disorganized behavior, unable to coordinate without 

significant prompting, basic hygiene.





Group Attendance: No groups gone to today. Patient was able to urinate and was 

more directable. 





Behavioral problems/significant events overnight: None reported.





Staff Report: The patient is making some improvements in terms of basic hygiene 

and feeding but still requires significant prompting.





Review Of Systems


Unable to obtain due to mental status.





Psychotherapy


None on this visit.





Vital Signs


Reviewed.





Mental Status Examination


General: Poor hygiene





Speech: Nearly mute





Thought processes: Appears linear





MSK: Psychomotor retardation





Thought content: Unknown





Abstract reasoning, and computation: Appears impaired





Description of associations: Appears impaired





Description of abnormal or psychotic thoughts: Unknown





Judgment: impaired





Insight: impaired





Orientation: Appears alert and able to interact





Cognition: Slowed





Recent and remote memory: Impaired





Attention span and concentration: Impaired





Fund of knowledge: Unknown





Mood: "I want to go home"





Affect: Profoundly flat and dysthymic





Diagnoses


Schizophrenia.





Catatonia.





Alcohol use disorder, unspecified.





Assessment and Plan


Schizophrenia: Continue to hold off on neuroleptics. Patient making progress 

with catatonia treatment.





Catatonia: CMP and urinalysis unremarkable. We will continue to monitor patient 

feeding more.





Alcohol use disorder: Will continue Ativan for catatonia. Continue to screen for

any dysregulation consistent with alcohol withdrawal, as unknown amount of 

alcohol use prior.





Disposition


The patient will need a further inpatient admission due to his severe impairing 

psychosis and catatonia, making him unable to care for himself.





Time Spent


15 minutes.





Friday





Vital Signs





Vital Signs








  Date Time  Temp Pulse Resp B/P (MAP) Pulse Ox O2 Delivery O2 Flow Rate FiO2


 


1/24/20 09:00  89  140/72    


 


1/24/20 07:05 98.1  14     


 


1/23/20 15:51     97   


 


1/23/20 06:31      Room Air  











Laboratory Data


[facility] Labs


Laboratory Tests 2


1/23/20 11:14: 


Anion Gap 7L, Glomerular Filtration Rate > 60.0, Calcium Level 8.3L, Total 

Bilirubin 0.6, Aspartate Amino Transf (AST/SGOT) 17, Alanine Aminotransferase 

(ALT/SGPT) 36, Alkaline Phosphatase 97, Total Protein 6.6, Albumin 3.3, 

Albumin/Globulin Ratio 1.00


CBC/BMP


Laboratory Tests


1/23/20 11:14











Current Medications





Current Medications








 Medications


  (Trade)  Dose


 Ordered  Sig/Nimo


 Route


 PRN Reason  Start Time


 Stop Time Status Last Admin


Dose Admin


 


 Acetaminophen


  (Tylenol Tab)  650 mg  Q6HP  PRN


 PO


 HEADACHE or DISCOMFORT  1/19/20 15:15


     





 


 Al Hydrox/Mg


 Hydrox/Simethicone


  (Mylanta)  30 ml  Q4HP  PRN


 PO


 HEARTBURN/INDIGESTION  1/19/20 15:15


     





 


 Amlodipine


 Besylate


  (Norvasc)  2.5 mg  DAILY


 PO


   1/20/20 09:00


 1/21/20 08:37 DC 1/21/20 08:30





 


 Amlodipine


 Besylate


  (Norvasc)  5 mg  DAILY


 PO


   1/22/20 09:00


    1/22/20 08:20





 


 Clozapine


  (Clozaril)  25 mg  QHS


 PO


   1/22/20 21:00


 1/23/20 12:16 DC 1/22/20 21:33





 


 Home Med


  (Med Rec


 Complete!)    ASDIRECTED


 XX


   1/19/20 12:45


 1/19/20 12:44 DC  





 


 Lorazepam


  (Ativan)  0.5 mg  TID


 PO


   1/20/20 16:00


 1/20/20 13:08 DC  





 


 Lorazepam


  (Ativan)  1 mg  Q6H


 PO


   1/19/20 22:00


 1/20/20 13:09 DC 1/20/20 10:33





 


 Lorazepam


  (Ativan)  1 mg  Q6H


 PO


   1/20/20 18:00


 1/22/20 08:26 DC 1/22/20 05:51





 


 Lorazepam


  (Ativan)  1 mg  TID


 PO


   1/22/20 16:00


    1/23/20 09:38





 


 Magnesium


 Hydroxide


  (Milk Of


 Magnesia)  30 ml  DAILYPRN  PRN


 PO


 CONSTIPATION  1/19/20 15:15


     





 


 Nicotine


  (Nicoderm Cq


 21mg)  1 patch  DAILY


 TD


   1/20/20 09:00


     





 


 Olanzapine


  (ZyPREXA


 **ZYDIS**)  10 mg  TIDP  PRN


 PO


 ANXIETY/AGITATION  1/19/20 15:00


 1/21/20 15:16 DC 1/19/20 21:39





 


 Paliperidone


  (Invega)  3 mg  BID


 PO


   1/19/20 21:00


 1/20/20 12:58 DC 1/20/20 09:12





 


 Trazodone HCl


  (Desyrel)  50 mg  QHSP  PRN


 PO


 INSOMNIA  1/19/20 21:00


    1/19/20 21:39














Allergies


Coded Allergies:  


     No Known Allergies (Unverified , 1/19/20)











FRANCESCO GERMAIN DO              Jan 24, 2020 11:05

## 2020-01-25 VITALS — DIASTOLIC BLOOD PRESSURE: 69 MMHG | SYSTOLIC BLOOD PRESSURE: 128 MMHG

## 2020-01-25 VITALS — DIASTOLIC BLOOD PRESSURE: 67 MMHG | SYSTOLIC BLOOD PRESSURE: 124 MMHG

## 2020-01-25 VITALS — SYSTOLIC BLOOD PRESSURE: 125 MMHG | DIASTOLIC BLOOD PRESSURE: 62 MMHG

## 2020-01-25 RX ADMIN — NICOTINE SCH PATCH: 21 PATCH, EXTENDED RELEASE TRANSDERMAL at 09:00

## 2020-01-26 VITALS — DIASTOLIC BLOOD PRESSURE: 80 MMHG | SYSTOLIC BLOOD PRESSURE: 123 MMHG

## 2020-01-26 VITALS — SYSTOLIC BLOOD PRESSURE: 121 MMHG | DIASTOLIC BLOOD PRESSURE: 65 MMHG

## 2020-01-26 VITALS — SYSTOLIC BLOOD PRESSURE: 125 MMHG | DIASTOLIC BLOOD PRESSURE: 79 MMHG

## 2020-01-26 RX ADMIN — NICOTINE SCH PATCH: 21 PATCH, EXTENDED RELEASE TRANSDERMAL at 09:00

## 2020-01-27 VITALS — DIASTOLIC BLOOD PRESSURE: 59 MMHG | SYSTOLIC BLOOD PRESSURE: 109 MMHG

## 2020-01-27 RX ADMIN — NICOTINE SCH PATCH: 21 PATCH, EXTENDED RELEASE TRANSDERMAL at 09:00

## 2020-01-27 RX ADMIN — Medication SCH: at 09:00

## 2020-01-27 NOTE — MHIPNPDOC
[facility] Progress Note


Progress Note








Inpatient Progress Note


Rik Pringle


MRN: N/A


Date of Birth: N/A


Date of Service: 01/27/2020


History of Present Illness


Patient presented catatonic with a history of schizophrenia and significant 

mental health transfer from home.





Interval History


Psychiatric symptoms today: The patient continues to be bizarre, sitting in bed,

staring off, difficult to redirect.





Affective: Unable to determine.





Psychotic: The patient still having very disorganized behavior but has made some

improvements in showering, has been eating more, although repeats various words,

is generally negativistic.





Anxiety: Unable to screen.





Misc: Still has some disorganized behavior, unable to coordinate without sig

nificant prompting, basic hygiene.





Group Attendance: No groups gone to today. Patient was able to urinate and was 

more directable. 





Behavioral problems/significant events overnight: None reported.





Staff Report: The patient is making some improvements in terms of basic hygiene 

and feeding but still requires significant prompting.





Review Of Systems


Unable to determine due to mental status.





Psychotherapy


None on this visit.





Vital Signs


Reviewed.





Mental Status Examination


General: Poor hygiene





Speech: Nearly mute





Thought processes: Appears linear





MSK: Psychomotor retardation





Thought content: Unknown





Abstract reasoning, and computation: Appears impaired





Description of associations: Appears impaired





Description of abnormal or psychotic thoughts: Unknown





Judgment: impaired





Insight: impaired





Orientation: Appears alert and able to interact





Cognition: Slowed





Recent and remote memory: Impaired





Attention span and concentration: Impaired





Fund of knowledge: Unknown





Mood: "I want to go home"





Affect: Profoundly flat and dysthymic





Diagnoses


Schizophrenia.





Catatonia.





Alcohol use disorder, unspecified.





Assessment and Plan


Schizophrenia: We will try Abilify 5 mg nightly as he is improving on the 

Ativan.





Catatonia: Continue Ativan, patient is able to feed.





Alcohol use disorder: Will continue Ativan for catatonia. Continue to screen for

any dysregulation consistent with alcohol withdrawal, as unknown amount of 

alcohol use prior.





Disposition


The patient will need a further inpatient admission due to his severe impairing 

psychosis and catatonia, making him unable to care for himself.





The patient will be converted to [facility] and referral for [facility] is in 

process.





Time Spent


15 minutes.





Monday





Vital Signs





Vital Signs








  Date Time  Temp Pulse Resp B/P (MAP) Pulse Ox O2 Delivery O2 Flow Rate FiO2


 


1/27/20 06:26 98.5 54 16 109/59 (76)    


 


1/26/20 20:51     96 Room Air  











Current Medications





Current Medications








 Medications


  (Trade)  Dose


 Ordered  Sig/Nimo


 Route


 PRN Reason  Start Time


 Stop Time Status Last Admin


Dose Admin


 


 Acetaminophen


  (Tylenol Tab)  650 mg  Q6HP  PRN


 PO


 HEADACHE or DISCOMFORT  1/19/20 15:15


     





 


 Al Hydrox/Mg


 Hydrox/Simethicone


  (Mylanta)  30 ml  Q4HP  PRN


 PO


 HEARTBURN/INDIGESTION  1/19/20 15:15


     





 


 Amlodipine


 Besylate


  (Norvasc)  2.5 mg  DAILY


 PO


   1/20/20 09:00


 1/21/20 08:37 DC 1/21/20 08:30





 


 Amlodipine


 Besylate


  (Norvasc)  5 mg  DAILY


 PO


   1/22/20 09:00


    1/22/20 08:20





 


 Clozapine


  (Clozaril)  25 mg  QHS


 PO


   1/22/20 21:00


 1/23/20 12:16 DC 1/22/20 21:33





 


 Home Med


  (Med Rec


 Complete!)    ASDIRECTED


 XX


   1/19/20 12:45


 1/19/20 12:44 DC  





 


 Lorazepam


  (Ativan)  0.5 mg  TID


 PO


   1/20/20 16:00


 1/20/20 13:08 DC  





 


 Lorazepam


  (Ativan)  1 mg  Q6H


 PO


   1/19/20 22:00


 1/20/20 13:09 DC 1/20/20 10:33





 


 Lorazepam


  (Ativan)  1 mg  Q6H


 PO


   1/20/20 18:00


 1/22/20 08:26 DC 1/22/20 05:51





 


 Lorazepam


  (Ativan)  1 mg  TID


 PO


   1/22/20 16:00


    1/25/20 15:02





 


 Magnesium


 Hydroxide


  (Milk Of


 Magnesia)  30 ml  DAILYPRN  PRN


 PO


 CONSTIPATION  1/19/20 15:15


     





 


 Nicotine


  (Nicoderm Cq


 21mg)  1 patch  DAILY


 TD


   1/20/20 09:00


     





 


 Olanzapine


  (ZyPREXA


 **ZYDIS**)  10 mg  TIDP  PRN


 PO


 ANXIETY/AGITATION  1/19/20 15:00


 1/21/20 15:16 DC 1/19/20 21:39





 


 Paliperidone


  (Invega)  3 mg  BID


 PO


   1/19/20 21:00


 1/20/20 12:58 DC 1/20/20 09:12





 


 Trazodone HCl


  (Desyrel)  50 mg  QHSP  PRN


 PO


 INSOMNIA  1/19/20 21:00


    1/19/20 21:39














Allergies


Coded Allergies:  


     No Known Allergies (Unverified , 1/19/20)











FRANCESCO GERMAIN DO              Jan 27, 2020 09:01

## 2020-01-28 RX ADMIN — NICOTINE SCH PATCH: 21 PATCH, EXTENDED RELEASE TRANSDERMAL at 09:00

## 2020-01-28 RX ADMIN — Medication SCH: at 09:00

## 2020-01-28 NOTE — MHIPNPDOC
[facility] Progress Note


Progress Note








Inpatient Progress Note


Rik Pringle


MRN: N/A


Date of Birth: N/A


Date of Service: 01/28/2020


History of Present Illness


Patient presented catatonic with a history of schizophrenia and significant 

mental health transfer from home.





Interval History


Psychiatric symptoms today: The patient is met with, but is still difficult to 

converse with. He walks around aimlessly, saying that he "wants to go home," 

asking the question repeatedly, saying "I'm fine" in a bizarre manner.





Affective: Unable to determine.





Psychotic: The patient still having very disorganized behavior but has made some

improvements in showering, has been eating more, although repeats various words,

is generally negativistic.





Anxiety: Unable to screen.





Misc: Poor sleeping behavior, does require prompting for eating.





Group Attendance: No groups gone to today.  





Behavioral problems/significant events overnight: None reported.





Staff Report: The patient is making some improvements in terms of basic hygiene 

and feeding but still requires significant prompting.





Review Of Systems


Unable to determine due to mental status.





Psychotherapy


None on this visit.





Vital Signs


Reviewed.





Mental Status Examination


General: Poor hygiene





Speech: Nearly mute





Thought processes: Appears linear





MSK: Psychomotor retardation





Thought content: Unknown





Abstract reasoning, and computation: Appears impaired





Description of associations: Appears impaired





Description of abnormal or psychotic thoughts: Unknown





Judgment: impaired





Insight: impaired





Orientation: Appears alert and able to interact





Cognition: Slowed





Recent and remote memory: Impaired





Attention span and concentration: Impaired





Fund of knowledge: Unknown





Mood: "I want to go home"





Affect: Profoundly flat and dysthymic





Diagnoses


Schizophrenia.





Catatonia.





Alcohol use disorder, unspecified.





Assessment and Plan


Schizophrenia: Increase Abilify to 10 mg nightly.





Catatonia: Continue Ativan, patient is able to feed.





Alcohol use disorder: Will continue Ativan for catatonia. Continue to screen for

any dysregulation consistent with alcohol withdrawal, as unknown amount of 

alcohol use prior.





Disposition


The patient will need a further inpatient admission due to his severe impairing 

psychosis and catatonia, making him unable to care for himself.





The patient will be converted to [facility] and referral for [facility] is in 

process.





Time Spent


15 minutes.





Tuesday





Vital Signs





Vital Signs








  Date Time  Temp Pulse Resp B/P (MAP) Pulse Ox O2 Delivery O2 Flow Rate FiO2


 


1/28/20 07:58      Room Air  


 


1/27/20 09:30  79  109/59    


 


1/27/20 09:13     97   


 


1/27/20 06:26 98.5  16     











Current Medications





Current Medications








 Medications


  (Trade)  Dose


 Ordered  Sig/Nimo


 Route


 PRN Reason  Start Time


 Stop Time Status Last Admin


Dose Admin


 


 Acetaminophen


  (Tylenol Tab)  650 mg  Q6HP  PRN


 PO


 HEADACHE or DISCOMFORT  1/19/20 15:15


     





 


 Al Hydrox/Mg


 Hydrox/Simethicone


  (Mylanta)  30 ml  Q4HP  PRN


 PO


 HEARTBURN/INDIGESTION  1/19/20 15:15


     





 


 Amlodipine


 Besylate


  (Norvasc)  2.5 mg  DAILY


 PO


   1/20/20 09:00


 1/21/20 08:37 DC 1/21/20 08:30





 


 Amlodipine


 Besylate


  (Norvasc)  5 mg  DAILY


 PO


   1/22/20 09:00


    1/27/20 09:30





 


 Aripiprazole


  (AbiLIFY)  5 mg  QHS


 PO


   1/27/20 21:00


     





 


 Clozapine


  (Clozaril)  25 mg  QHS


 PO


   1/22/20 21:00


 1/23/20 12:16 DC 1/22/20 21:33





 


 Home Med


  (Med Rec


 Complete!)    ASDIRECTED


 XX


   1/19/20 12:45


 1/19/20 12:44 DC  





 


 Lorazepam


  (Ativan)  0.5 mg  TID


 PO


   1/20/20 16:00


 1/20/20 13:08 DC  





 


 Lorazepam


  (Ativan)  1 mg  Q6H


 PO


   1/19/20 22:00


 1/20/20 13:09 DC 1/20/20 10:33





 


 Lorazepam


  (Ativan)  1 mg  Q6H


 PO


   1/20/20 18:00


 1/22/20 08:26 DC 1/22/20 05:51





 


 Lorazepam


  (Ativan)  1 mg  TID


 PO


   1/22/20 16:00


    1/27/20 16:13





 


 Magnesium


 Hydroxide


  (Milk Of


 Magnesia)  30 ml  DAILYPRN  PRN


 PO


 CONSTIPATION  1/19/20 15:15


     





 


 Nicotine


  (Nicoderm Cq


 21mg)  1 patch  DAILY


 TD


   1/20/20 09:00


     





 


 Non-Formulary


 Medication


  (** See Comment


 Field Below **)  SEE


 COMMENTS


 SECTION  1T@10


 XX


   1/29/20 10:00


 1/27/20 10:37 DC  





 


 Non-Formulary


 Medication


  (** See Comment


 Field Below **)  SEE LABEL


 COMMENTS  DAILY


 XX


   1/27/20 09:00


     





 


 Olanzapine


  (ZyPREXA


 **ZYDIS**)  10 mg  TIDP  PRN


 PO


 ANXIETY/AGITATION  1/19/20 15:00


 1/21/20 15:16 DC 1/19/20 21:39





 


 Paliperidone


  (Invega)  3 mg  BID


 PO


   1/19/20 21:00


 1/20/20 12:58 DC 1/20/20 09:12





 


 Trazodone HCl


  (Desyrel)  50 mg  QHSP  PRN


 PO


 INSOMNIA  1/19/20 21:00


    1/19/20 21:39














Allergies


Coded Allergies:  


     No Known Allergies (Unverified , 1/19/20)











FRANCESCO GERMAIN DO              Jan 28, 2020 10:42

## 2020-01-29 RX ADMIN — NICOTINE SCH PATCH: 21 PATCH, EXTENDED RELEASE TRANSDERMAL at 09:00

## 2020-01-29 RX ADMIN — Medication SCH: at 09:00

## 2020-01-29 NOTE — MHIPNPDOC
[facility] Progress Note


Progress Note








Inpatient Progress Note


Rik Pringle


MRN: N/A


Date of Birth: N/A


Date of Service: 01/29/2020


History of Present Illness


Patient presented catatonic with a history of schizophrenia and significant 

mental health transfer from home.





Interval History


Psychiatric symptoms today: The patient is met with, but is still difficult to 

converse with. He walks around aimlessly, saying that he "wants to go home," 

asking the question repeatedly, saying "I'm fine" in a bizarre manner.





Affective: Unable to determine.





Psychotic: The patient still having very disorganized behavior but has made some

improvements in showering, has been eating more, although repeats various words,

is generally negativistic.





Anxiety: Unable to screen.





Misc: Poor sleeping behavior, does require prompting for eating.





Group Attendance: No groups gone to today.  





Behavioral problems/significant events overnight: None reported.





Staff Report: The patient has continually walked around in a bizarre manner 

requiring significant prompting for feeding.





Review Of Systems


Unable to determine due to patient's mental status.





Psychotherapy


None on this visit.





Vital Signs


Reviewed.





Mental Status Examination


General: Poor hygiene





Speech: Nearly mute





Thought processes: Appears linear





MSK: Psychomotor retardation





Thought content: Unknown





Abstract reasoning, and computation: Appears impaired





Description of associations: Appears impaired





Description of abnormal or psychotic thoughts: Unknown





Judgment: impaired





Insight: impaired





Orientation: Appears alert and able to interact





Cognition: Slowed





Recent and remote memory: Impaired





Attention span and concentration: Impaired





Fund of knowledge: Unknown





Mood: "I want to go home"





Affect: Profoundly flat and dysthymic





Diagnoses


Schizophrenia.





Catatonia.





Alcohol use disorder, unspecified.





Assessment and Plan


Schizophrenia: Continue to offer Abilify 10 mg nightly, patient not taking 

medications.





Catatonia: Continue Ativan, patient is able to feed.





Alcohol use disorder: Will continue Ativan for catatonia. Continue to screen for

any dysregulation consistent with alcohol withdrawal, as unknown amount of 

alcohol use prior.





Disposition


The patient will need a further inpatient admission due to his severe impairing 

psychosis and catatonia, making him unable to care for himself.





The patient will be converted to [facility] and referral for [facility] is in 

process.





Time Spent


15 minutes.





Wednesday





Vital Signs





Vital Signs








  Date Time  Temp Pulse Resp B/P (MAP) Pulse Ox O2 Delivery O2 Flow Rate FiO2


 


1/29/20 12:41      Room Air  


 


1/27/20 09:30  79  109/59    


 


1/27/20 09:13     97   


 


1/27/20 06:26 98.5  16     











Current Medications





Current Medications








 Medications


  (Trade)  Dose


 Ordered  Sig/Nimo


 Route


 PRN Reason  Start Time


 Stop Time Status Last Admin


Dose Admin


 


 Acetaminophen


  (Tylenol Tab)  650 mg  Q6HP  PRN


 PO


 HEADACHE or DISCOMFORT  1/19/20 15:15


     





 


 Al Hydrox/Mg


 Hydrox/Simethicone


  (Mylanta)  30 ml  Q4HP  PRN


 PO


 HEARTBURN/INDIGESTION  1/19/20 15:15


     





 


 Amlodipine


 Besylate


  (Norvasc)  2.5 mg  DAILY


 PO


   1/20/20 09:00


 1/21/20 08:37 DC 1/21/20 08:30





 


 Amlodipine


 Besylate


  (Norvasc)  5 mg  DAILY


 PO


   1/22/20 09:00


 1/28/20 15:46 DC 1/27/20 09:30





 


 Amlodipine


 Besylate


  (Norvasc)  10 mg  DAILY


 PO


   1/29/20 09:00


     





 


 Aripiprazole


  (AbiLIFY)  5 mg  QHS


 PO


   1/27/20 21:00


     





 


 Clozapine


  (Clozaril)  25 mg  QHS


 PO


   1/22/20 21:00


 1/23/20 12:16 DC 1/22/20 21:33





 


 Home Med


  (Med Rec


 Complete!)    ASDIRECTED


 XX


   1/19/20 12:45


 1/19/20 12:44 DC  





 


 Lorazepam


  (Ativan)  0.5 mg  TID


 PO


   1/20/20 16:00


 1/20/20 13:08 DC  





 


 Lorazepam


  (Ativan)  1 mg  Q6H


 PO


   1/19/20 22:00


 1/20/20 13:09 DC 1/20/20 10:33





 


 Lorazepam


  (Ativan)  1 mg  Q6H


 PO


   1/20/20 18:00


 1/22/20 08:26 DC 1/22/20 05:51





 


 Lorazepam


  (Ativan)  1 mg  TID


 PO


   1/22/20 16:00


    1/27/20 16:13





 


 Magnesium


 Hydroxide


  (Milk Of


 Magnesia)  30 ml  DAILYPRN  PRN


 PO


 CONSTIPATION  1/19/20 15:15


     





 


 Nicotine


  (Nicoderm Cq


 21mg)  1 patch  DAILY


 TD


   1/20/20 09:00


     





 


 Non-Formulary


 Medication


  (** See Comment


 Field Below **)  SEE


 COMMENTS


 SECTION  1T@10


 XX


   1/29/20 10:00


 1/27/20 10:37 DC  





 


 Non-Formulary


 Medication


  (** See Comment


 Field Below **)  SEE LABEL


 COMMENTS  DAILY


 XX


   1/27/20 09:00


     





 


 Olanzapine


  (ZyPREXA


 **ZYDIS**)  10 mg  TIDP  PRN


 PO


 ANXIETY/AGITATION  1/19/20 15:00


 1/21/20 15:16 DC 1/19/20 21:39





 


 Paliperidone


  (Invega)  3 mg  BID


 PO


   1/19/20 21:00


 1/20/20 12:58 DC 1/20/20 09:12





 


 Trazodone HCl


  (Desyrel)  50 mg  QHSP  PRN


 PO


 INSOMNIA  1/19/20 21:00


    1/19/20 21:39














Allergies


Coded Allergies:  


     No Known Allergies (Unverified , 1/19/20)











FRANCESCO GERMAIN DO              Jan 29, 2020 12:47

## 2020-01-30 RX ADMIN — Medication SCH: at 10:13

## 2020-01-30 RX ADMIN — NICOTINE SCH PATCH: 21 PATCH, EXTENDED RELEASE TRANSDERMAL at 10:13

## 2020-01-30 NOTE — MHIPNPDOC
[facility] Progress Note


Progress Note








Inpatient Progress Note


Rik Pringle


MRN: N/A


Date of Birth: N/A


Date of Service: 01/30/2020


History of Present Illness


Patient presented catatonic with a history of schizophrenia and significant 

mental health transfer from home.





Interval History


Psychiatric symptoms today: The patient is met with, but is still difficult to 

converse with. He walks around aimlessly, saying that he "wants to go home," 

asking the question repeatedly, saying "I'm fine" in a bizarre manner.





Affective: Unable to determine.





Psychotic: The patient still having very disorganized behavior but has made some

improvements in showering, has been eating more, although repeats various words,

is generally negativistic.





Anxiety: Unable to screen.





Misc: Poor sleeping behavior, does require prompting for eating.





Group Attendance: No groups gone to today.  





Behavioral problems/significant events overnight: None reported.





Staff Report: The patient has continually walked around in a bizarre manner 

requiring significant prompting for feeding.





Review Of Systems


Unable to determine due to patient's mental status.





Psychotherapy


None on this visit.





Vital Signs


Reviewed.





Mental Status Examination


General: Poor hygiene





Speech: Nearly mute





Thought processes: Appears linear





MSK: Psychomotor retardation





Thought content: Unknown





Abstract reasoning, and computation: Appears impaired





Description of associations: Appears impaired





Description of abnormal or psychotic thoughts: Unknown





Judgment: impaired





Insight: impaired





Orientation: Appears alert and able to interact





Cognition: Slowed





Recent and remote memory: Impaired





Attention span and concentration: Impaired





Fund of knowledge: Unknown





Mood: "I want to go home"





Affect: Profoundly flat and dysthymic





Diagnoses


Schizophrenia.





Catatonia.





Alcohol use disorder, unspecified.





Assessment and Plan


Schizophrenia: Continue to offer Abilify 10 mg nightly, patient not taking 

medications.





Catatonia: Continue Ativan, patient is able to feed.





Alcohol use disorder: Will continue Ativan for catatonia. Continue to screen for

any dysregulation consistent with alcohol withdrawal, as unknown amount of 

alcohol use prior.





Treatment over objection order placed, we'll pursue at this time.





Disposition


Patient will need further inpatient admission as he is severely decompensated, 

stopped taking medications and demonstrates no insight problems. Treatment over 

objection order placed. We'll perform assessment tomorrow upon serving patient.





Time Spent


15 minutes.





Thursday





Vital Signs





Vital Signs








  Date Time  Temp Pulse Resp B/P (MAP) Pulse Ox O2 Delivery O2 Flow Rate FiO2


 


1/29/20 12:41      Room Air  


 


1/27/20 09:30  79  109/59    


 


1/27/20 09:13     97   


 


1/27/20 06:26 98.5  16     











Current Medications





Current Medications








 Medications


  (Trade)  Dose


 Ordered  Sig/Nimo


 Route


 PRN Reason  Start Time


 Stop Time Status Last Admin


Dose Admin


 


 Acetaminophen


  (Tylenol Tab)  650 mg  Q6HP  PRN


 PO


 HEADACHE or DISCOMFORT  1/19/20 15:15


     





 


 Al Hydrox/Mg


 Hydrox/Simethicone


  (Mylanta)  30 ml  Q4HP  PRN


 PO


 HEARTBURN/INDIGESTION  1/19/20 15:15


     





 


 Amlodipine


 Besylate


  (Norvasc)  2.5 mg  DAILY


 PO


   1/20/20 09:00


 1/21/20 08:37 DC 1/21/20 08:30





 


 Amlodipine


 Besylate


  (Norvasc)  5 mg  DAILY


 PO


   1/22/20 09:00


 1/28/20 15:46 DC 1/27/20 09:30





 


 Amlodipine


 Besylate


  (Norvasc)  10 mg  DAILY


 PO


   1/29/20 09:00


     





 


 Aripiprazole


  (AbiLIFY)  5 mg  QHS


 PO


   1/27/20 21:00


     





 


 Clozapine


  (Clozaril)  25 mg  QHS


 PO


   1/22/20 21:00


 1/23/20 12:16 DC 1/22/20 21:33





 


 Home Med


  (Med Rec


 Complete!)    ASDIRECTED


 XX


   1/19/20 12:45


 1/19/20 12:44 DC  





 


 Lorazepam


  (Ativan)  0.5 mg  TID


 PO


   1/20/20 16:00


 1/20/20 13:08 DC  





 


 Lorazepam


  (Ativan)  1 mg  Q6H


 PO


   1/19/20 22:00


 1/20/20 13:09 DC 1/20/20 10:33





 


 Lorazepam


  (Ativan)  1 mg  Q6H


 PO


   1/20/20 18:00


 1/22/20 08:26 DC 1/22/20 05:51





 


 Lorazepam


  (Ativan)  1 mg  TID


 PO


   1/22/20 16:00


    1/27/20 16:13





 


 Magnesium


 Hydroxide


  (Milk Of


 Magnesia)  30 ml  DAILYPRN  PRN


 PO


 CONSTIPATION  1/19/20 15:15


     





 


 Nicotine


  (Nicoderm Cq


 21mg)  1 patch  DAILY


 TD


   1/20/20 09:00


     





 


 Non-Formulary


 Medication


  (** See Comment


 Field Below **)  SEE


 COMMENTS


 SECTION  1T@10


 XX


   1/29/20 10:00


 1/27/20 10:37 DC  





 


 Non-Formulary


 Medication


  (** See Comment


 Field Below **)  SEE LABEL


 COMMENTS  DAILY


 XX


   1/27/20 09:00


     





 


 Olanzapine


  (ZyPREXA


 **ZYDIS**)  10 mg  TIDP  PRN


 PO


 ANXIETY/AGITATION  1/19/20 15:00


 1/21/20 15:16 DC 1/19/20 21:39





 


 Paliperidone


  (Invega)  3 mg  BID


 PO


   1/19/20 21:00


 1/20/20 12:58 DC 1/20/20 09:12





 


 Trazodone HCl


  (Desyrel)  50 mg  QHSP  PRN


 PO


 INSOMNIA  1/19/20 21:00


    1/19/20 21:39














Allergies


Coded Allergies:  


     No Known Allergies (Unverified , 1/19/20)











FRANCESCO GERMAIN DO              Jan 30, 2020 10:43

## 2020-01-30 NOTE — ECGEPIP
[facility]

                                       

                                       Test Date:    2020

Pat Name:     POLO BARKLEY            Department:   

Patient ID:   [id number]                 Room:         [place] [address]

Gender:       Male                     Technician:   

:          [id number]               Requested By: FRANCESCO GERMAIN 

Order Number: [id number]     Reading MD:   Deangelo Corrales

                                 Measurements

Intervals                              Axis          

Rate:         78                       P:            70

FL:           165                      QRS:          48

QRSD:         94                       T:            48

QT:           348                                    

QTc:          398                                    

                           Interpretive Statements

SINUS RHYTHM WITH FREQUENT VENTRICULAR PREMATURE COMPLEXES

ABNORMAL RHYTHM ECG

No prior tracing in the system

Electronically Signed on 2020 0:55:22 EST by Deangelo Corrales

## 2020-01-31 RX ADMIN — NICOTINE SCH PATCH: 21 PATCH, EXTENDED RELEASE TRANSDERMAL at 09:00

## 2020-01-31 RX ADMIN — Medication SCH: at 09:00

## 2020-01-31 NOTE — MHIPNPDOC
[facility] Progress Note


Progress Note








Inpatient Progress Note


Rik Pringle


MRN: N/A


Date of Birth: N/A


Date of Service: 01/31/2020


History of Present Illness


Patient presented catatonic with a history of schizophrenia and significant 

mental health transfer from home.





Interval History


Psychiatric symptoms today: The patient is met with discharge planner, but 

continues to state "I'm fine" and "I want to go home" in a bizarre manner 

frequently making unusual rocking motions continually asking this provider, 

however, he has served with his treatment over objection  letter.





Affective: Unable to determine.





Psychotic: The patient still having very disorganized behavior but has made some

improvements in showering, has been eating more, although repeats various words,

is generally negativistic.





Anxiety: Unable to screen.





Misc: Poor sleeping behavior, does require prompting for eating.





Group Attendance: No groups gone to today.  





Behavioral problems/significant events overnight: None reported.





Staff Report: The patient has continually walked around in a bizarre manner 

requiring significant prompting for feeding.





Review Of Systems


Unable to determine due to patient's mental status.





Psychotherapy


None on this visit.





Vital Signs


Reviewed.





Mental Status Examination


General: Poor hygiene





Speech: Significantly slowed, repeats same phrase over and over.





Thought processes: Appears linear





MSK: Psychomotor retardation





Thought content: Unknown





Abstract reasoning, and computation: Appears impaired





Description of associations: Appears impaired





Description of abnormal or psychotic thoughts: Unknown





Judgment: impaired





Insight: impaired





Orientation: Appears alert and able to interact





Cognition: Slowed





Recent and remote memory: Impaired





Attention span and concentration: Impaired





Fund of knowledge: Unknown





Mood: "I want to go home, I'm perfectly fine."





Affect: Profoundly flat and dysthymic





Diagnoses


Schizophrenia.





Catatonia.





Alcohol use disorder, unspecified.





Assessment and Plan


Schizophrenia: Continue to offer Abilify 10 mg nightly, patient not taking 

medications.





Catatonia: Continue Ativan, patient is able to feed.





Alcohol use disorder: Will continue Ativan for catatonia. Continue to screen for

any dysregulation consistent with alcohol withdrawal, as unknown amount of 

alcohol use prior.





Treatment over objection assessment completed, we'll document today. We'll have 

second evaluation when [facility] presents for other meetings next Tuesday.





Disposition


Patient will need further inpatient admission as he is severely decompensated, 

stopped taking medications and demonstrates no insight problems. Treatment over 

objection order placed. Patient will be pursued for treatment at this time as he

continues to have no ability to understand the risks and benefits of not taking 

medications.





Time Spent


15 minutes.





Friday





Vital Signs





Vital Signs








  Date Time  Temp Pulse Resp B/P (MAP) Pulse Ox O2 Delivery O2 Flow Rate FiO2


 


1/29/20 12:41      Room Air  


 


1/27/20 09:30  79  109/59    


 


1/27/20 09:13     97   


 


1/27/20 06:26 98.5  16     











Current Medications





Current Medications








 Medications


  (Trade)  Dose


 Ordered  Sig/Nimo


 Route


 PRN Reason  Start Time


 Stop Time Status Last Admin


Dose Admin


 


 Acetaminophen


  (Tylenol Tab)  650 mg  Q6HP  PRN


 PO


 HEADACHE or DISCOMFORT  1/19/20 15:15


     





 


 Al Hydrox/Mg


 Hydrox/Simethicone


  (Mylanta)  30 ml  Q4HP  PRN


 PO


 HEARTBURN/INDIGESTION  1/19/20 15:15


     





 


 Amlodipine


 Besylate


  (Norvasc)  2.5 mg  DAILY


 PO


   1/20/20 09:00


 1/21/20 08:37 DC 1/21/20 08:30





 


 Amlodipine


 Besylate


  (Norvasc)  5 mg  DAILY


 PO


   1/22/20 09:00


 1/28/20 15:46 DC 1/27/20 09:30





 


 Amlodipine


 Besylate


  (Norvasc)  10 mg  DAILY


 PO


   1/29/20 09:00


     





 


 Aripiprazole


  (AbiLIFY)  5 mg  QHS


 PO


   1/27/20 21:00


     





 


 Clozapine


  (Clozaril)  25 mg  QHS


 PO


   1/22/20 21:00


 1/23/20 12:16 DC 1/22/20 21:33





 


 Home Med


  (Med Rec


 Complete!)    ASDIRECTED


 XX


   1/19/20 12:45


 1/19/20 12:44 DC  





 


 Lorazepam


  (Ativan)  0.5 mg  TID


 PO


   1/20/20 16:00


 1/20/20 13:08 DC  





 


 Lorazepam


  (Ativan)  1 mg  Q6H


 PO


   1/19/20 22:00


 1/20/20 13:09 DC 1/20/20 10:33





 


 Lorazepam


  (Ativan)  1 mg  Q6H


 PO


   1/20/20 18:00


 1/22/20 08:26 DC 1/22/20 05:51





 


 Lorazepam


  (Ativan)  1 mg  TID


 PO


   1/22/20 16:00


    1/27/20 16:13





 


 Magnesium


 Hydroxide


  (Milk Of


 Magnesia)  30 ml  DAILYPRN  PRN


 PO


 CONSTIPATION  1/19/20 15:15


     





 


 Nicotine


  (Nicoderm Cq


 21mg)  1 patch  DAILY


 TD


   1/20/20 09:00


     





 


 Non-Formulary


 Medication


  (** See Comment


 Field Below **)  SEE


 COMMENTS


 SECTION  1T@10


 XX


   1/29/20 10:00


 1/27/20 10:37 DC  





 


 Non-Formulary


 Medication


  (** See Comment


 Field Below **)  SEE LABEL


 COMMENTS  DAILY


 XX


   1/27/20 09:00


     





 


 Olanzapine


  (ZyPREXA


 **ZYDIS**)  10 mg  TIDP  PRN


 PO


 ANXIETY/AGITATION  1/19/20 15:00


 1/21/20 15:16 DC 1/19/20 21:39





 


 Paliperidone


  (Invega)  3 mg  BID


 PO


   1/19/20 21:00


 1/20/20 12:58 DC 1/20/20 09:12





 


 Trazodone HCl


  (Desyrel)  50 mg  QHSP  PRN


 PO


 INSOMNIA  1/19/20 21:00


    1/19/20 21:39














Allergies


Coded Allergies:  


     No Known Allergies (Unverified , 1/19/20)











FRANCESCO GERMAIN DO              Jan 31, 2020 10:09

## 2020-01-31 NOTE — MHCRPDOC
[facility] Consultation


Consultation


DATE OF SERVICE: 1/31/20





ADMIT DATE: 1/19/2019


 


TREATMENT OVER OBJECTION





ATTENDING DOCTOR: Francesco Santana DO





FAMILY CONTACTS: Brother





SECTION I: CLINICAL SUMMARY:





HISTORY OF ADMISSION:  The patient a 44-year-old man who was transferred from 

[facility], is brought to our inpatient unit due to bizarre behavior. He was 

brought in initially to [place] due to significant bizarre actions such as going 

into a neighbor's house and becoming mute, shaking backwards and forwards, 

engaging in unusual behavior. He was brought in and assessed where he was 

brought in for admission. He reportedly has a diagnosis of schizophrenia, 

however, he is completely catatonic at this time and unable to engage in any 

meaningful interview other than repeat the words "I want to go home." Thus, the 

information below was taken from the chart and extracted as appropriate. Labs on

admission suggest that he has been starving for nearly a week, reportedly found 

by brother in a basement. 





DIAGNOSIS Schizophrenia and Catatonia 





SECTION II: Proposed Treatment.


1. Course of treatment recommended by treating physician: To prescribe either


an antipsychotic or mood stabilizer or both, such as the following:


-Abilify 5 mg once a day with the option of increasing this dose


progressively up to 30mg per day as tolerated for signs and symptoms of


psychosis. Initiate Abilify long acting depo 300-400mg intramuscular every 

month after sufficient oral medication trial .  If the patient refuses treatment

by mouth, the patient should receive


Haldol intramuscular 5 mg at various doses up to a total of 30 mg per day.


- Cogentin 0.5 to 1mg daily up to a total of 4 mg per day in divided doses if 

Parkinsonian symptoms are evident.


-Ativan 0.5mg up to 2mg TID as need, for catatonia symptoms, oral or IM if 

refused 





2. Reasonable alternatives if any are:


-Invega 3mg up to 12mg once daily, zyprexa 5mg BID up to 30mg BID IM if refused;

If stabilized on Invega giving Invega Sustenna 154 up to 235mg IM every month 

for long acting injectable





3. Has the patient been tried on the proposed treatment: Abilify on our unit, 

appears to have tolerated it.





4. Anticipated benefits to proposed treatment: The patient will be able to 

have basic ability to control her behavior and coordinate his care, as well as 

attend to basic needs of life of which he is unable to at this time. 





5. Reasonable foreseeable adverse side effects: Parkinsonian symptoms, weight


gain, sedation, side effects of neuroleptics. In rare cases, neuroleptic


malignant syndrome and tardive dyskinesia may develop. However, the treatment


team believes that the benefit outweighs any risk.





6. Prognosis without treatment: Grim, he would likely die due to his inability

to meet basic needs, he remains gravely unable to communicate needs





SECTION III: Patient's capacity.


1. Explained to the patient:


A. Condition: Y


B. Proposed treatment: Y


C. Anticipated benefits of treatment: Y


D. Risks of adverse side effects of treatment: Y


E. Availability of other treatments and comparison of benefits and risks: Y


F. Risk of no treatment:Y





Patient can only respond with "I don't need any meds" and "I'm perfectly fine" 

of which he repeats in a monotone voice. 





2. State the nature of the patient's objections to treatment: unclear, only 

states "I'm perfectly fine"





3. The patient's capacity: In my opinion, the patient lacks the capacity to


make any important decisions on his treatment at this time. The patient lacks


insight into the nature and severity of his illness, his need for treatment and


his prognosis.





SECTION IV: LIKELIHOOD FOR DANGEROUS BEHAVIOR:


1. The patient is believed to be dangerous to others at the hospital unless


treated: No 





2. Is the patient believed to be likely dangerous to self if not treated:Yes, 

unable to care for self, was found starving in a basement, needs huge amount of 

effort to get him to feed or drink water. 











SECTION V: ANY OTHER INFORMATION:n/a





Vital Signs





Vital Signs








  Date Time  Temp Pulse Resp B/P (MAP) Pulse Ox O2 Delivery O2 Flow Rate FiO2


 


1/29/20 12:41      Room Air  


 


1/27/20 09:30  79  109/59    


 


1/27/20 09:13     97   


 


1/27/20 06:26 98.5  16     











Home Medications


Current Medications





Current Medications








 Medications


  (Trade)  Dose


 Ordered  Sig/Nimo


 Route


 PRN Reason  Start Time


 Stop Time Status Last Admin


Dose Admin


 


 Acetaminophen


  (Tylenol Tab)  650 mg  Q6HP  PRN


 PO


 HEADACHE or DISCOMFORT  1/19/20 15:15


     





 


 Al Hydrox/Mg


 Hydrox/Simethicone


  (Mylanta)  30 ml  Q4HP  PRN


 PO


 HEARTBURN/INDIGESTION  1/19/20 15:15


     





 


 Amlodipine


 Besylate


  (Norvasc)  2.5 mg  DAILY


 PO


   1/20/20 09:00


 1/21/20 08:37 DC 1/21/20 08:30





 


 Amlodipine


 Besylate


  (Norvasc)  5 mg  DAILY


 PO


   1/22/20 09:00


 1/28/20 15:46 DC 1/27/20 09:30





 


 Amlodipine


 Besylate


  (Norvasc)  10 mg  DAILY


 PO


   1/29/20 09:00


     





 


 Aripiprazole


  (AbiLIFY)  5 mg  QHS


 PO


   1/27/20 21:00


     





 


 Clozapine


  (Clozaril)  25 mg  QHS


 PO


   1/22/20 21:00


 1/23/20 12:16 DC 1/22/20 21:33





 


 Home Med


  (Med Rec


 Complete!)    ASDIRECTED


 XX


   1/19/20 12:45


 1/19/20 12:44 DC  





 


 Lorazepam


  (Ativan)  0.5 mg  TID


 PO


   1/20/20 16:00


 1/20/20 13:08 DC  





 


 Lorazepam


  (Ativan)  1 mg  Q6H


 PO


   1/19/20 22:00


 1/20/20 13:09 DC 1/20/20 10:33





 


 Lorazepam


  (Ativan)  1 mg  Q6H


 PO


   1/20/20 18:00


 1/22/20 08:26 DC 1/22/20 05:51





 


 Lorazepam


  (Ativan)  1 mg  TID


 PO


   1/22/20 16:00


    1/27/20 16:13





 


 Magnesium


 Hydroxide


  (Milk Of


 Magnesia)  30 ml  DAILYPRN  PRN


 PO


 CONSTIPATION  1/19/20 15:15


     





 


 Nicotine


  (Nicoderm Cq


 21mg)  1 patch  DAILY


 TD


   1/20/20 09:00


     





 


 Non-Formulary


 Medication


  (** See Comment


 Field Below **)  SEE


 COMMENTS


 SECTION  1T@10


 XX


   1/29/20 10:00


 1/27/20 10:37 DC  





 


 Non-Formulary


 Medication


  (** See Comment


 Field Below **)  SEE LABEL


 COMMENTS  DAILY


 XX


   1/27/20 09:00


     





 


 Olanzapine


  (ZyPREXA


 **ZYDIS**)  10 mg  TIDP  PRN


 PO


 ANXIETY/AGITATION  1/19/20 15:00


 1/21/20 15:16 DC 1/19/20 21:39





 


 Paliperidone


  (Invega)  3 mg  BID


 PO


   1/19/20 21:00


 1/20/20 12:58 DC 1/20/20 09:12





 


 Trazodone HCl


  (Desyrel)  50 mg  QHSP  PRN


 PO


 INSOMNIA  1/19/20 21:00


    1/19/20 21:39











No Active Prescriptions or Reported Meds





Allergies


Coded Allergies:  


     No Known Allergies (Unverified , 1/19/20)











FRANCESCO SANTANA DO              Jan 31, 2020 16:30

## 2020-02-01 RX ADMIN — NICOTINE SCH PATCH: 21 PATCH, EXTENDED RELEASE TRANSDERMAL at 08:44

## 2020-02-01 RX ADMIN — Medication SCH: at 08:44

## 2020-02-02 RX ADMIN — Medication SCH: at 08:24

## 2020-02-02 RX ADMIN — NICOTINE SCH PATCH: 21 PATCH, EXTENDED RELEASE TRANSDERMAL at 08:24

## 2020-02-03 RX ADMIN — Medication SCH: at 09:00

## 2020-02-03 RX ADMIN — NICOTINE SCH PATCH: 21 PATCH, EXTENDED RELEASE TRANSDERMAL at 09:00

## 2020-02-03 RX ADMIN — Medication SCH DOSE: at 21:00

## 2020-02-03 NOTE — MHIPNPDOC
[facility] Progress Note


Progress Note








Inpatient Progress Note


Rik Pringle


MRN: N/[id number]


Date of Birth: N/A


Date of Service: 02/03/2020


History of Present Illness


Patient presented catatonic with a history of schizophrenia and significant 

mental health transfer from home.





Interval History


Psychiatric symptoms today: The patient is met with briefly. Continues to state 

"I'm fine" "and I want to go home" in a bizarre manner. Continuingly walking 

around in circles with no behavior change.





Affective: Unable to determine.





Psychotic: The patient still having very disorganized behavior but has made some

improvements in showering, has been eating more, although repeats various words,

is generally negativistic.





Anxiety: Unable to screen.





Misc: Poor sleeping behavior, does require prompting for eating.





Group Attendance: No groups gone to today.  





Behavioral problems/significant events overnight: None reported.





Staff Report: The patient has continually walked around in a bizarre manner 

requiring significant prompting for feeding.





Review Of Systems


Unable to determine due to patient's mental status.





Psychotherapy


None on this visit.





Vital Signs


Reviewed.





Mental Status Examination


General: Poor hygiene





Speech: Significantly slowed, repeats same phrase over and over.





Thought processes: Appears linear





MSK: Psychomotor retardation





Thought content: Unknown





Abstract reasoning, and computation: Appears impaired





Description of associations: Appears impaired





Description of abnormal or psychotic thoughts: Unknown





Judgment: impaired





Insight: impaired





Orientation: Appears alert and able to interact





Cognition: Slowed





Recent and remote memory: Impaired





Attention span and concentration: Impaired





Fund of knowledge: Unknown





Mood: "I want to go home, I'm perfectly fine."





Affect: Profoundly flat and dysthymic





Diagnoses


Schizophrenia.





Catatonia.





Alcohol use disorder, unspecified.





Rash, unspecified.





Assessment and Plan


Schizophrenia: Continue to offer Abilify 10 mg nightly, patient not taking 

medications.





Catatonia: Continue Ativan, patient is able to feed.





Alcohol use disorder: Will continue Ativan for catatonia. Continue to screen for

any dysregulation consistent with alcohol withdrawal, as unknown amount of 

alcohol use prior.





Treatment over objection assessment completed, we'll document today. We'll have 

second evaluation when [facility] presents for other meetings next Tuesday.





Rash: Will have hospitalist examine. Appears to be full body rash.





Disposition


Patient will need further inpatient admission as he is severely decompensated, 

stopped taking medications and demonstrates no insight problems. Treatment over 

objection order placed. Patient will be pursued for treatment at this time as he

continues to have no ability to understand the risks and benefits of not taking 

medications.





Time Spent


15 minutes.





Monday





Vital Signs





Vital Signs








  Date Time  Temp Pulse Resp B/P (MAP) Pulse Ox O2 Delivery O2 Flow Rate FiO2


 


1/29/20 12:41      Room Air  











Current Medications





Current Medications








 Medications


  (Trade)  Dose


 Ordered  Sig/Nimo


 Route


 PRN Reason  Start Time


 Stop Time Status Last Admin


Dose Admin


 


 Acetaminophen


  (Tylenol Tab)  650 mg  Q6HP  PRN


 PO


 HEADACHE or DISCOMFORT  1/19/20 15:15


     





 


 Al Hydrox/Mg


 Hydrox/Simethicone


  (Mylanta)  30 ml  Q4HP  PRN


 PO


 HEARTBURN/INDIGESTION  1/19/20 15:15


     





 


 Amlodipine


 Besylate


  (Norvasc)  2.5 mg  DAILY


 PO


   1/20/20 09:00


 1/21/20 08:37 DC 1/21/20 08:30





 


 Amlodipine


 Besylate


  (Norvasc)  5 mg  DAILY


 PO


   1/22/20 09:00


 1/28/20 15:46 DC 1/27/20 09:30





 


 Amlodipine


 Besylate


  (Norvasc)  10 mg  DAILY


 PO


   1/29/20 09:00


     





 


 Aripiprazole


  (AbiLIFY)  5 mg  QHS


 PO


   1/27/20 21:00


     





 


 Clozapine


  (Clozaril)  25 mg  QHS


 PO


   1/22/20 21:00


 1/23/20 12:16 DC 1/22/20 21:33





 


 Home Med


  (Med Rec


 Complete!)    ASDIRECTED


 XX


   1/19/20 12:45


 1/19/20 12:44 DC  





 


 Lorazepam


  (Ativan)  0.5 mg  TID


 PO


   1/20/20 16:00


 1/20/20 13:08 DC  





 


 Lorazepam


  (Ativan)  1 mg  Q6H


 PO


   1/19/20 22:00


 1/20/20 13:09 DC 1/20/20 10:33





 


 Lorazepam


  (Ativan)  1 mg  Q6H


 PO


   1/20/20 18:00


 1/22/20 08:26 DC 1/22/20 05:51





 


 Lorazepam


  (Ativan)  1 mg  TID


 PO


   1/22/20 16:00


    1/27/20 16:13





 


 Magnesium


 Hydroxide


  (Milk Of


 Magnesia)  30 ml  DAILYPRN  PRN


 PO


 CONSTIPATION  1/19/20 15:15


     





 


 Nicotine


  (Nicoderm Cq


 21mg)  1 patch  DAILY


 TD


   1/20/20 09:00


     





 


 Non-Formulary


 Medication


  (** See Comment


 Field Below **)  SEE


 COMMENTS


 SECTION  1T@10


 XX


   1/29/20 10:00


 1/27/20 10:37 DC  





 


 Non-Formulary


 Medication


  (** See Comment


 Field Below **)  SEE LABEL


 COMMENTS  DAILY


 XX


   1/27/20 09:00


     





 


 Olanzapine


  (ZyPREXA


 **ZYDIS**)  10 mg  TIDP  PRN


 PO


 ANXIETY/AGITATION  1/19/20 15:00


 1/21/20 15:16 DC 1/19/20 21:39





 


 Paliperidone


  (Invega)  3 mg  BID


 PO


   1/19/20 21:00


 1/20/20 12:58 DC 1/20/20 09:12





 


 Trazodone HCl


  (Desyrel)  50 mg  QHSP  PRN


 PO


 INSOMNIA  1/19/20 21:00


    1/19/20 21:39














Allergies


Coded Allergies:  


     No Known Allergies (Unverified , 1/19/20)











FRANCESCO GERMAIN DO               Feb 3, 2020 10:39

## 2020-02-04 VITALS — SYSTOLIC BLOOD PRESSURE: 107 MMHG | DIASTOLIC BLOOD PRESSURE: 68 MMHG

## 2020-02-04 RX ADMIN — NICOTINE SCH PATCH: 21 PATCH, EXTENDED RELEASE TRANSDERMAL at 09:00

## 2020-02-04 RX ADMIN — Medication SCH DOSE: at 20:19

## 2020-02-04 RX ADMIN — Medication SCH: at 09:00

## 2020-02-04 RX ADMIN — Medication SCH DOSE: at 09:00

## 2020-02-04 RX ADMIN — Medication SCH DOSE: at 15:20

## 2020-02-04 NOTE — MHIPNPDOC
[facility] Progress Note


Progress Note








Inpatient Progress Note


Rik Pringle


MRN: N/A


Date of Birth: N/A


Date of Service: 02/04/2020


History of Present Illness


Patient presented catatonic with a history of schizophrenia and significant 

mental health transfer from home.





Interval History


Psychiatric symptoms today: The patient is met with briefly, again continues to 

state "I am fine" and "I want to go home" in a bizarre, unusual manner and 

working around in circles with no behavioral change.





Affective: Unable to determine.





Psychotic: The patient still having very disorganized behavior but has made some

improvements in showering, has been eating more, although repeats various words,

is generally negativistic.





Anxiety: Unable to screen.





Misc: Poor sleeping behavior, does require prompting for eating.





Group Attendance: No groups gone to today.  





Behavioral problems/significant events overnight: None reported.





Staff Report: The patient has continually walked around in a bizarre manner 

requiring significant prompting for feeding.





Review Of Systems


Unable to determine due to patient's mental status.





Psychotherapy


None on this visit.





Vital Signs


Reviewed.





Mental Status Examination


General: Poor hygiene





Speech: Significantly slowed, repeats same phrase over and over.





Thought processes: Appears linear





MSK: Psychomotor retardation





Thought content: Unknown





Abstract reasoning, and computation: Appears impaired





Description of associations: Appears impaired





Description of abnormal or psychotic thoughts: Unknown





Judgment: impaired





Insight: impaired





Orientation: Appears alert and able to interact





Cognition: Slowed





Recent and remote memory: Impaired





Attention span and concentration: Impaired





Fund of knowledge: Unknown





Mood: "I want to go home, I'm perfectly fine."





Affect: Profoundly flat and dysthymic





Diagnoses


Schizophrenia.





Catatonia.





Alcohol use disorder, unspecified.





Rash, unspecified.





Assessment and Plan


Schizophrenia: Continue to offer Abilify 10 mg nightly, patient not taking 

medications.





Catatonia: Continue Ativan, patient is able to feed.





Alcohol use disorder: Will continue Ativan for catatonia. Continue to screen for

any dysregulation consistent with alcohol withdrawal, as unknown amount of 

alcohol use prior.





Treatment over objection assessment completed. Patient is slated for evaluation 

today for second opinion.





Rash: The patient will have lab work per hospitalist and medications.





Disposition


Patient will need further inpatient admission as he is severely decompensated, 

stopped taking medications and demonstrates no insight problems. Treatment over 

objection order placed. Patient will be pursued for treatment at this time as he

continues to have no ability to understand the risks and benefits of not taking 

medications.





Time Spent


15 minutes.





Tuesday





Vital Signs





Vital Signs








  Date Time  Temp Pulse Resp B/P (MAP) Pulse Ox O2 Delivery O2 Flow Rate FiO2


 


1/29/20 12:41      Room Air  











Current Medications





Current Medications








 Medications


  (Trade)  Dose


 Ordered  Sig/Nimo


 Route


 PRN Reason  Start Time


 Stop Time Status Last Admin


Dose Admin


 


 Acetaminophen


  (Tylenol Tab)  650 mg  Q6HP  PRN


 PO


 HEADACHE or DISCOMFORT  1/19/20 15:15


     





 


 Al Hydrox/Mg


 Hydrox/Simethicone


  (Mylanta)  30 ml  Q4HP  PRN


 PO


 HEARTBURN/INDIGESTION  1/19/20 15:15


     





 


 Amlodipine


 Besylate


  (Norvasc)  2.5 mg  DAILY


 PO


   1/20/20 09:00


 1/21/20 08:37 DC 1/21/20 08:30





 


 Amlodipine


 Besylate


  (Norvasc)  5 mg  DAILY


 PO


   1/22/20 09:00


 1/28/20 15:46 DC 1/27/20 09:30





 


 Amlodipine


 Besylate


  (Norvasc)  10 mg  DAILY


 PO


   1/29/20 09:00


     





 


 Aripiprazole


  (AbiLIFY)  5 mg  QHS


 PO


   1/27/20 21:00


     





 


 Clozapine


  (Clozaril)  25 mg  QHS


 PO


   1/22/20 21:00


 1/23/20 12:16 DC 1/22/20 21:33





 


 Emollient Cream


  (Vanicream)  APPLY TO


 AFFECTED


 AREAS  TID


 TOP


   2/3/20 21:00


     





 


 Home Med


  (Med Rec


 Complete!)    ASDIRECTED


 XX


   1/19/20 12:45


 1/19/20 12:44 DC  





 


 Lorazepam


  (Ativan)  0.5 mg  TID


 PO


   1/20/20 16:00


 1/20/20 13:08 DC  





 


 Lorazepam


  (Ativan)  1 mg  Q6H


 PO


   1/19/20 22:00


 1/20/20 13:09 DC 1/20/20 10:33





 


 Lorazepam


  (Ativan)  1 mg  Q6H


 PO


   1/20/20 18:00


 1/22/20 08:26 DC 1/22/20 05:51





 


 Lorazepam


  (Ativan)  1 mg  TID


 PO


   1/22/20 16:00


    1/27/20 16:13





 


 Magnesium


 Hydroxide


  (Milk Of


 Magnesia)  30 ml  DAILYPRN  PRN


 PO


 CONSTIPATION  1/19/20 15:15


     





 


 Nicotine


  (Nicoderm Cq


 21mg)  1 patch  DAILY


 TD


   1/20/20 09:00


     





 


 Non-Formulary


 Medication


  (** See Comment


 Field Below **)  SEE


 COMMENTS


 SECTION  1T@10


 XX


   1/29/20 10:00


 1/27/20 10:37 DC  





 


 Non-Formulary


 Medication


  (** See Comment


 Field Below **)  SEE LABEL


 COMMENTS  DAILY


 XX


   1/27/20 09:00


     





 


 Olanzapine


  (ZyPREXA


 **ZYDIS**)  10 mg  TIDP  PRN


 PO


 ANXIETY/AGITATION  1/19/20 15:00


 1/21/20 15:16 DC 1/19/20 21:39





 


 Paliperidone


  (Invega)  3 mg  BID


 PO


   1/19/20 21:00


 1/20/20 12:58 DC 1/20/20 09:12





 


 Trazodone HCl


  (Desyrel)  50 mg  QHSP  PRN


 PO


 INSOMNIA  1/19/20 21:00


    1/19/20 21:39














Allergies


Coded Allergies:  


     No Known Allergies (Unverified , 1/19/20)











FRANCESCO GERMAIN DO               Feb 4, 2020 12:14

## 2020-02-05 RX ADMIN — Medication SCH: at 08:46

## 2020-02-05 RX ADMIN — Medication SCH DOSE: at 08:46

## 2020-02-05 RX ADMIN — Medication SCH DOSE: at 20:42

## 2020-02-05 RX ADMIN — Medication SCH DOSE: at 15:27

## 2020-02-05 RX ADMIN — NICOTINE SCH PATCH: 21 PATCH, EXTENDED RELEASE TRANSDERMAL at 08:46

## 2020-02-05 NOTE — MHCRPDOC
[facility] Consultation


Consultation


DATE OF Service: 2/5/20  





ADMIT DATE: 1/19/2019


 


TREATMENT OVER OBJECTION





ATTENDING DOCTOR: Magdiel Santana DO





FAMILY CONTACTS: Brother





SECTION I: CLINICAL SUMMARY:





HISTORY OF ADMISSION:  The patient a 44-year-old man who was transferred from 

[facility], is brought to our inpatient unit due to bizarre behavior. He was 

brought in initially to [place] due to significant bizarre actions such as going 

into a neighbor's house and becoming mute, shaking backwards and forwards, 

engaging in unusual behavior. He was brought in and assessed where he was 

brought in for admission. He reportedly has a diagnosis of schizophrenia, 

however, he is completely catatonic at this time and unable to engage in any 

meaningful interview other than repeat the words "I want to go home." Thus, the 

information below was taken from the chart and extracted as appropriate. Labs on

admission suggest that he has been starving for nearly a week, reportedly found 

by brother in a basement. 





DIAGNOSIS Schizophrenia and Catatonia 





SECTION II: Proposed Treatment.


1. Course of treatment recommended by treating physician: To prescribe either


an antipsychotic or mood stabilizer or both, such as the following:


-Abilify 5 mg once a day with the option of increasing this dose


progressively up to 30mg per day as tolerated for signs and symptoms of


psychosis. Initiate Abilify long acting depo 300-400mg intramuscular every 

month after sufficient oral medication trial .  If the patient refuses treatment

by mouth, the patient should receive


Haldol intramuscular 5 mg at various doses up to a total of 30 mg per day.


- Cogentin 0.5 to 1mg daily up to a total of 4 mg per day in divided doses if 

Parkinsonian symptoms are evident.


-Ativan 0.5mg up to 2mg TID as need, for catatonia symptoms, oral or IM if 

refused 





2. Reasonable alternatives if any are:


-Invega 3mg up to 12mg once daily, zyprexa 5mg BID up to 30mg BID IM if refused;

If stabilized on Invega giving Invega Sustenna 154 up to 235mg IM every month 

for long acting injectable





3. Has the patient been tried on the proposed treatment: Abilify on our unit, 

appears to have tolerated it.





4. Anticipated benefits to proposed treatment: The patient will be able to 

have basic ability to control her behavior and coordinate his care, as well as 

attend to basic needs of life of which he is unable to at this time. 





5. Reasonable foreseeable adverse side effects: Parkinsonian symptoms, weight


gain, sedation, side effects of neuroleptics. In rare cases, neuroleptic


malignant syndrome and tardive dyskinesia may develop. However, the treatment


team believes that the benefit outweighs any risk.





6. Prognosis without treatment: Grim, he would likely die due to his inability

to meet basic needs, he remains gravely unable to communicate needs





SECTION III: Patient's capacity.


1. Explained to the patient:


A. Condition: Y


B. Proposed treatment: Y


C. Anticipated benefits of treatment: Y


D. Risks of adverse side effects of treatment: Y


E. Availability of other treatments and comparison of benefits and risks: Y


F. Risk of no treatment:Y





Patient can only respond with "I don't need any meds" and "I'm perfectly fine" 

of which he repeats in a monotone voice. 





2. State the nature of the patient's objections to treatment: unclear, only 

states "I'm perfectly fine"





3. The patient's capacity: In my opinion, the patient lacks the capacity to


make any important decisions on his treatment at this time. The patient lacks


insight into the nature and severity of his illness, his need for treatment and


his prognosis.





SECTION IV: LIKELIHOOD FOR DANGEROUS BEHAVIOR:


1. The patient is believed to be dangerous to others at the hospital unless


treated: No 





2. Is the patient believed to be likely dangerous to self if not treated:Yes, 

unable to care for self, was found starving in a basement, needs huge amount of 

effort to get him to feed or drink water. 











SECTION V: ANY OTHER INFORMATION:n/a





Patient seen and agree with above.





Vital Signs





Vital Signs








  Date Time  Temp Pulse Resp B/P (MAP) Pulse Ox O2 Delivery O2 Flow Rate FiO2


 


2/4/20 16:04 98.5 73 18 107/68 (81)    











Home Medications


Current Medications





Current Medications








 Medications


  (Trade)  Dose


 Ordered  Sig/Nimo


 Route


 PRN Reason  Start Time


 Stop Time Status Last Admin


Dose Admin


 


 Acetaminophen


  (Tylenol Tab)  650 mg  Q6HP  PRN


 PO


 HEADACHE or DISCOMFORT  1/19/20 15:15


     





 


 Al Hydrox/Mg


 Hydrox/Simethicone


  (Mylanta)  30 ml  Q4HP  PRN


 PO


 HEARTBURN/INDIGESTION  1/19/20 15:15


     





 


 Amlodipine


 Besylate


  (Norvasc)  2.5 mg  DAILY


 PO


   1/20/20 09:00


 1/21/20 08:37 DC 1/21/20 08:30





 


 Amlodipine


 Besylate


  (Norvasc)  5 mg  DAILY


 PO


   1/22/20 09:00


 1/28/20 15:46 DC 1/27/20 09:30





 


 Amlodipine


 Besylate


  (Norvasc)  10 mg  DAILY


 PO


   1/29/20 09:00


     





 


 Aripiprazole


  (AbiLIFY)  5 mg  QHS


 PO


   1/27/20 21:00


     





 


 Clozapine


  (Clozaril)  25 mg  QHS


 PO


   1/22/20 21:00


 1/23/20 12:16 DC 1/22/20 21:33





 


 Emollient Cream


  (Vanicream)  APPLY TO


 AFFECTED


 AREAS  TID


 TOP


   2/3/20 21:00


     





 


 Home Med


  (Med Rec


 Complete!)    ASDIRECTED


 XX


   1/19/20 12:45


 1/19/20 12:44 DC  





 


 Lorazepam


  (Ativan)  0.5 mg  TID


 PO


   1/20/20 16:00


 1/20/20 13:08 DC  





 


 Lorazepam


  (Ativan)  1 mg  Q6H


 PO


   1/19/20 22:00


 1/20/20 13:09 DC 1/20/20 10:33





 


 Lorazepam


  (Ativan)  1 mg  Q6H


 PO


   1/20/20 18:00


 1/22/20 08:26 DC 1/22/20 05:51





 


 Lorazepam


  (Ativan)  1 mg  TID


 PO


   1/22/20 16:00


    1/27/20 16:13





 


 Magnesium


 Hydroxide


  (Milk Of


 Magnesia)  30 ml  DAILYPRN  PRN


 PO


 CONSTIPATION  1/19/20 15:15


     





 


 Nicotine


  (Nicoderm Cq


 21mg)  1 patch  DAILY


 TD


   1/20/20 09:00


     





 


 Non-Formulary


 Medication


  (** See Comment


 Field Below **)  SEE


 COMMENTS


 SECTION  1T@10


 XX


   1/29/20 10:00


 1/27/20 10:37 DC  





 


 Non-Formulary


 Medication


  (** See Comment


 Field Below **)  SEE LABEL


 COMMENTS  DAILY


 XX


   1/27/20 09:00


     





 


 Olanzapine


  (ZyPREXA


 **ZYDIS**)  10 mg  TIDP  PRN


 PO


 ANXIETY/AGITATION  1/19/20 15:00


 1/21/20 15:16 DC 1/19/20 21:39





 


 Paliperidone


  (Invega)  3 mg  BID


 PO


   1/19/20 21:00


 1/20/20 12:58 DC 1/20/20 09:12





 


 Trazodone HCl


  (Desyrel)  50 mg  QHSP  PRN


 PO


 INSOMNIA  1/19/20 21:00


    1/19/20 21:39











No Active Prescriptions or Reported Meds





Allergies


Coded Allergies:  


     No Known Allergies (Unverified , 1/19/20)











ANGELLA CASTANO DO           Feb 5, 2020 4:05 pm

## 2020-02-05 NOTE — MHIPNPDOC
[facility] Progress Note


Progress Note








Inpatient Progress Note


Rik Pringle


MRN: N/A


Date of Birth: N/A


Date of Service: 02/05/2020


History of Present Illness


Patient presented catatonic with a history of schizophrenia and significant 

mental health transfer from home.





Interval History


Psychiatric symptoms today: The patient is met with briefly, again continues to 

state "I am fine" and "I want to go home" in a bizarre, unusual manner and 

working around in circles with no behavioral change.





Affective: Unable to determine.





Psychotic: The patient still having very disorganized behavior but has made some

improvements in showering, has been eating more, although repeats various words,

is generally negativistic.





Anxiety: Unable to screen.





Misc: Poor sleeping behavior, does require prompting for eating.





Group Attendance: No groups gone to today.  





Behavioral problems/significant events overnight: None reported.





Staff Report: The patient has continually walked around in a bizarre manner 

requiring significant prompting for feeding.





Review Of Systems


Unable to determine due to patient's mental status.





Psychotherapy


None on this visit.





Vital Signs


Reviewed.





Mental Status Examination


General: Poor hygiene





Speech: Significantly slowed, repeats same phrase over and over





Thought processes: Appears linear at times





MSK: Psychomotor retardation





Thought content: Unknown





Abstract reasoning, and computation: Appears impaired





Description of associations: Appears impaired





Description of abnormal or psychotic thoughts: Unknown





Judgment: impaired





Insight: impaired





Orientation: Appears alert and able to interact





Cognition: Slowed





Recent and remote memory: Impaired





Attention span and concentration: Impaired





Fund of knowledge: Unknown





Mood: "I want to go home, I'm perfectly fine."





Affect: Profoundly flat and dysthymic





Diagnoses


Schizophrenia.





Catatonia.





Alcohol use disorder, unspecified.





Rash, unspecified.





Assessment and Plan


Schizophrenia: Continue to offer Abilify 10 mg nightly, patient not taking 

medications.





Catatonia: Continue Ativan, patient is able to feed.





Alcohol use disorder: Will continue Ativan for catatonia. Continue to screen for

any dysregulation consistent with alcohol withdrawal, as unknown amount of alcoh

ol use prior.





Treatment over objection assessment completed. Patient is slated for evaluation 

today for second opinion. Treatment over objection has been completed for 

secondary evaluation. Will have court date shortly assigned.





Rash: The patient will have lab work per hospitalist and medications.





Disposition


Patient will need further inpatient admission as he is severely decompensated, 

stopped taking medications and demonstrates no insight problems. Treatment over 

objection order placed. Patient will be pursued for treatment at this time as he

continues to have no ability to understand the risks and benefits of not taking 

medications.





Time Spent


15 minutes.





Wednesday





Vital Signs





Vital Signs








  Date Time  Temp Pulse Resp B/P (MAP) Pulse Ox O2 Delivery O2 Flow Rate FiO2


 


2/4/20 16:04 98.5 73 18 107/68 (81)    











Current Medications





Current Medications








 Medications


  (Trade)  Dose


 Ordered  Sig/Nimo


 Route


 PRN Reason  Start Time


 Stop Time Status Last Admin


Dose Admin


 


 Acetaminophen


  (Tylenol Tab)  650 mg  Q6HP  PRN


 PO


 HEADACHE or DISCOMFORT  1/19/20 15:15


     





 


 Al Hydrox/Mg


 Hydrox/Simethicone


  (Mylanta)  30 ml  Q4HP  PRN


 PO


 HEARTBURN/INDIGESTION  1/19/20 15:15


     





 


 Amlodipine


 Besylate


  (Norvasc)  2.5 mg  DAILY


 PO


   1/20/20 09:00


 1/21/20 08:37 DC 1/21/20 08:30





 


 Amlodipine


 Besylate


  (Norvasc)  5 mg  DAILY


 PO


   1/22/20 09:00


 1/28/20 15:46 DC 1/27/20 09:30





 


 Amlodipine


 Besylate


  (Norvasc)  10 mg  DAILY


 PO


   1/29/20 09:00


     





 


 Aripiprazole


  (AbiLIFY)  5 mg  QHS


 PO


   1/27/20 21:00


     





 


 Clozapine


  (Clozaril)  25 mg  QHS


 PO


   1/22/20 21:00


 1/23/20 12:16 DC 1/22/20 21:33





 


 Emollient Cream


  (Vanicream)  APPLY TO


 AFFECTED


 AREAS  TID


 TOP


   2/3/20 21:00


     





 


 Home Med


  (Med Rec


 Complete!)    ASDIRECTED


 XX


   1/19/20 12:45


 1/19/20 12:44 DC  





 


 Lorazepam


  (Ativan)  0.5 mg  TID


 PO


   1/20/20 16:00


 1/20/20 13:08 DC  





 


 Lorazepam


  (Ativan)  1 mg  Q6H


 PO


   1/19/20 22:00


 1/20/20 13:09 DC 1/20/20 10:33





 


 Lorazepam


  (Ativan)  1 mg  Q6H


 PO


   1/20/20 18:00


 1/22/20 08:26 DC 1/22/20 05:51





 


 Lorazepam


  (Ativan)  1 mg  TID


 PO


   1/22/20 16:00


    1/27/20 16:13





 


 Magnesium


 Hydroxide


  (Milk Of


 Magnesia)  30 ml  DAILYPRN  PRN


 PO


 CONSTIPATION  1/19/20 15:15


     





 


 Nicotine


  (Nicoderm Cq


 21mg)  1 patch  DAILY


 TD


   1/20/20 09:00


     





 


 Non-Formulary


 Medication


  (** See Comment


 Field Below **)  SEE


 COMMENTS


 SECTION  1T@10


 XX


   1/29/20 10:00


 1/27/20 10:37 DC  





 


 Non-Formulary


 Medication


  (** See Comment


 Field Below **)  SEE LABEL


 COMMENTS  DAILY


 XX


   1/27/20 09:00


     





 


 Olanzapine


  (ZyPREXA


 **ZYDIS**)  10 mg  TIDP  PRN


 PO


 ANXIETY/AGITATION  1/19/20 15:00


 1/21/20 15:16 DC 1/19/20 21:39





 


 Paliperidone


  (Invega)  3 mg  BID


 PO


   1/19/20 21:00


 1/20/20 12:58 DC 1/20/20 09:12





 


 Trazodone HCl


  (Desyrel)  50 mg  QHSP  PRN


 PO


 INSOMNIA  1/19/20 21:00


    1/19/20 21:39














Allergies


Coded Allergies:  


     No Known Allergies (Unverified , 1/19/20)











FRANCESCO GERMAIN DO               Feb 5, 2020 11:48

## 2020-02-06 RX ADMIN — Medication SCH DOSE: at 15:43

## 2020-02-06 RX ADMIN — NICOTINE SCH PATCH: 21 PATCH, EXTENDED RELEASE TRANSDERMAL at 09:00

## 2020-02-06 RX ADMIN — Medication SCH DOSE: at 09:00

## 2020-02-06 RX ADMIN — Medication SCH DOSE: at 20:16

## 2020-02-06 RX ADMIN — Medication SCH: at 09:00

## 2020-02-06 NOTE — MHIPNPDOC
[facility] Progress Note


Progress Note








Inpatient Progress Note


Rik Pringle


MRN: N/A


Date of Birth: N/A


Date of Service: 02/06/2020


History of Present Illness


Patient presented catatonic with a history of schizophrenia and significant 

mental health transfer from home.





Interval History


Narrative: The patient still continually asks for discharge in a bizarre way, 

continues to follow this provider around for the rest of the day making odd 

statements.





Affective: Unknown.





Psychotic: Still disorganized, tangential and bizarre.





Anxiety: Unable to determine.





Eating and sleeping behaviors: Still disrupted, needs significant prompting for 

eating.





Group Attendance: None.





Medication Side effects: See ROS below.





Behavioral problems/significant events overnight: None reported.





Staff Report: Patient is still disorganized, walking around, asking to various 

passerby the same question in repeating loop.





Review Of Systems


Unable to determine due to patient's mental status.





Psychotherapy


None on this visit.





Vital Signs


Reviewed.





Mental Status Examination


General: Poor hygiene





Speech: Significantly slowed, repeats same phrase over and over





Thought processes: Appears linear at times





MSK: Psychomotor retardation





Thought content: Unknown





Abstract reasoning, and computation: Appears impaired





Description of associations: Appears impaired





Description of abnormal or psychotic thoughts: Unknown





Judgment: impaired





Insight: impaired





Orientation: Appears alert and able to interact





Cognition: Slowed





Recent and remote memory: Impaired





Attention span and concentration: Impaired





Fund of knowledge: Unknown





Mood: "I want to go home, I'm perfectly fine."





Affect: Profoundly flat and dysthymic





Diagnoses


Schizophrenia.





Catatonia.





Alcohol use disorder, unspecified.





Rash, unspecified.





Assessment and Plan


Schizophrenia: Continue to offer Abilify 10 mg nightly, patient not taking 

medications.





Catatonia: Continue Ativan, patient is able to feed.





Alcohol use disorder: Will continue Ativan for catatonia. Continue to screen for

any dysregulation consistent with alcohol withdrawal, as unknown amount of 

alcohol use prior.





Treatment over objection assessment completed. Patient is slated for evaluation 

today for second opinion. Treatment over objection has been completed for 

secondary evaluation. Will have court date shortly assigned.





Rash: The patient will have lab work per hospitalist and medications.





Disposition


Patient will need further inpatient admission as he is severely decompensated, s

topped taking medications and demonstrates no insight problems. Treatment over 

objection order placed. Patient will be pursued for treatment at this time as he

continues to have no ability to understand the risks and benefits of not taking 

medications.





Time Spent


15 minutes.





Thursday





Vital Signs





Vital Signs








  Date Time  Temp Pulse Resp B/P (MAP) Pulse Ox O2 Delivery O2 Flow Rate FiO2


 


2/4/20 16:04 98.5 73 18 107/68 (81)    











Current Medications





Current Medications








 Medications


  (Trade)  Dose


 Ordered  Sig/Nimo


 Route


 PRN Reason  Start Time


 Stop Time Status Last Admin


Dose Admin


 


 Acetaminophen


  (Tylenol Tab)  650 mg  Q6HP  PRN


 PO


 HEADACHE or DISCOMFORT  1/19/20 15:15


     





 


 Al Hydrox/Mg


 Hydrox/Simethicone


  (Mylanta)  30 ml  Q4HP  PRN


 PO


 HEARTBURN/INDIGESTION  1/19/20 15:15


     





 


 Amlodipine


 Besylate


  (Norvasc)  2.5 mg  DAILY


 PO


   1/20/20 09:00


 1/21/20 08:37 DC 1/21/20 08:30





 


 Amlodipine


 Besylate


  (Norvasc)  5 mg  DAILY


 PO


   1/22/20 09:00


 1/28/20 15:46 DC 1/27/20 09:30





 


 Amlodipine


 Besylate


  (Norvasc)  10 mg  DAILY


 PO


   1/29/20 09:00


     





 


 Aripiprazole


  (AbiLIFY)  5 mg  QHS


 PO


   1/27/20 21:00


     





 


 Clozapine


  (Clozaril)  25 mg  QHS


 PO


   1/22/20 21:00


 1/23/20 12:16 DC 1/22/20 21:33





 


 Emollient Cream


  (Vanicream)  APPLY TO


 AFFECTED


 AREAS  TID


 TOP


   2/3/20 21:00


     





 


 Home Med


  (Med Rec


 Complete!)    ASDIRECTED


 XX


   1/19/20 12:45


 1/19/20 12:44 DC  





 


 Lorazepam


  (Ativan)  0.5 mg  TID


 PO


   1/20/20 16:00


 1/20/20 13:08 DC  





 


 Lorazepam


  (Ativan)  1 mg  Q6H


 PO


   1/19/20 22:00


 1/20/20 13:09 DC 1/20/20 10:33





 


 Lorazepam


  (Ativan)  1 mg  Q6H


 PO


   1/20/20 18:00


 1/22/20 08:26 DC 1/22/20 05:51





 


 Lorazepam


  (Ativan)  1 mg  TID


 PO


   1/22/20 16:00


    1/27/20 16:13





 


 Magnesium


 Hydroxide


  (Milk Of


 Magnesia)  30 ml  DAILYPRN  PRN


 PO


 CONSTIPATION  1/19/20 15:15


     





 


 Nicotine


  (Nicoderm Cq


 21mg)  1 patch  DAILY


 TD


   1/20/20 09:00


     





 


 Non-Formulary


 Medication


  (** See Comment


 Field Below **)  SEE


 COMMENTS


 SECTION  1T@10


 XX


   1/29/20 10:00


 1/27/20 10:37 DC  





 


 Non-Formulary


 Medication


  (** See Comment


 Field Below **)  SEE LABEL


 COMMENTS  DAILY


 XX


   1/27/20 09:00


     





 


 Olanzapine


  (ZyPREXA


 **ZYDIS**)  10 mg  TIDP  PRN


 PO


 ANXIETY/AGITATION  1/19/20 15:00


 1/21/20 15:16 DC 1/19/20 21:39





 


 Paliperidone


  (Invega)  3 mg  BID


 PO


   1/19/20 21:00


 1/20/20 12:58 DC 1/20/20 09:12





 


 Trazodone HCl


  (Desyrel)  50 mg  QHSP  PRN


 PO


 INSOMNIA  1/19/20 21:00


    1/19/20 21:39














Allergies


Coded Allergies:  


     No Known Allergies (Unverified , 1/19/20)











FRANCESCO GERMAIN DO               Feb 6, 2020 10:47

## 2020-02-07 RX ADMIN — NICOTINE SCH PATCH: 21 PATCH, EXTENDED RELEASE TRANSDERMAL at 09:00

## 2020-02-07 RX ADMIN — Medication SCH DOSE: at 15:33

## 2020-02-07 RX ADMIN — Medication SCH: at 09:00

## 2020-02-07 RX ADMIN — Medication SCH DOSE: at 09:00

## 2020-02-07 RX ADMIN — Medication SCH DOSE: at 20:35

## 2020-02-07 NOTE — MHIPNPDOC
[facility] Progress Note


Progress Note








Inpatient Progress Note


Rik Pringle


MRN: N/A


Date of Birth: N/A


Date of Service: 02/07/2020


History of Present Illness


Patient presented catatonic with a history of schizophrenia and significant 

mental health transfer from home.





Interval History


Narrative: The patient still continually asks for discharge in a bizarre way, 

continues to follow this provider around for the rest of the day making odd 

statements.





Affective: Unknown.





Psychotic: Still disorganized, tangential and bizarre.





Anxiety: Unable to determine.





Eating and sleeping behaviors: Still disrupted, needs significant prompting for 

eating.





Group Attendance: None.





Medication Side effects: See ROS below.





Behavioral problems/significant events overnight: None reported.





Staff Report: Patient is still disorganized, walking around, asking to various 

passerby the same question in repeating loop.





Review Of Systems


Unable to determine due to patient's mental status.





Psychotherapy


None on this visit.





Vital Signs


Reviewed.





Mental Status Examination


General: Poor hygiene





Speech: Significantly slowed, repeats same phrase over and over





Thought processes: Appears linear at times





MSK: Psychomotor retardation





Thought content: Unknown





Abstract reasoning, and computation: Appears impaired





Description of associations: Appears impaired





Description of abnormal or psychotic thoughts: Unknown





Judgment: impaired





Insight: impaired





Orientation: Appears alert and able to interact





Cognition: Slowed





Recent and remote memory: Impaired





Attention span and concentration: Impaired





Fund of knowledge: Unknown





Mood: "I want to go home, I'm perfectly fine."





Affect: Profoundly flat and dysthymic





Diagnoses


Schizophrenia.





Catatonia.





Alcohol use disorder, unspecified.





Rash, unspecified.





Assessment and Plan


Schizophrenia: Continue to offer Abilify 10 mg nightly, patient not taking 

medications.





Catatonia: Continue Ativan, patient is able to feed.





Alcohol use disorder: Will continue Ativan for catatonia. Continue to screen for

any dysregulation consistent with alcohol withdrawal, as unknown amount of 

alcohol use prior.





Treatment over objection assessment completed. Patient is slated for evaluation 

today for second opinion. Treatment over objection has been completed for 

secondary evaluation. Patient's court date assigned for next Friday. 





Rash: Patient noncompliance. 





Disposition


Patient will need further inpatient admission due to a severe inability to 

attend to basic needs.He is still quite impaired and needs a large amount of 

assistance.





Time Spent


15 minutes.





Friday





Vital Signs





Vital Signs








  Date Time  Temp Pulse Resp B/P (MAP) Pulse Ox O2 Delivery O2 Flow Rate FiO2


 


2/4/20 16:04 98.5 73 18 107/68 (81)    











Current Medications





Current Medications








 Medications


  (Trade)  Dose


 Ordered  Sig/Nimo


 Route


 PRN Reason  Start Time


 Stop Time Status Last Admin


Dose Admin


 


 Acetaminophen


  (Tylenol Tab)  650 mg  Q6HP  PRN


 PO


 HEADACHE or DISCOMFORT  1/19/20 15:15


     





 


 Al Hydrox/Mg


 Hydrox/Simethicone


  (Mylanta)  30 ml  Q4HP  PRN


 PO


 HEARTBURN/INDIGESTION  1/19/20 15:15


     





 


 Amlodipine


 Besylate


  (Norvasc)  2.5 mg  DAILY


 PO


   1/20/20 09:00


 1/21/20 08:37 DC 1/21/20 08:30





 


 Amlodipine


 Besylate


  (Norvasc)  5 mg  DAILY


 PO


   1/22/20 09:00


 1/28/20 15:46 DC 1/27/20 09:30





 


 Amlodipine


 Besylate


  (Norvasc)  10 mg  DAILY


 PO


   1/29/20 09:00


     





 


 Aripiprazole


  (AbiLIFY)  5 mg  QHS


 PO


   1/27/20 21:00


     





 


 Clozapine


  (Clozaril)  25 mg  QHS


 PO


   1/22/20 21:00


 1/23/20 12:16 DC 1/22/20 21:33





 


 Emollient Cream


  (Vanicream)  APPLY TO


 AFFECTED


 AREAS  TID


 TOP


   2/3/20 21:00


     





 


 Home Med


  (Med Rec


 Complete!)    ASDIRECTED


 XX


   1/19/20 12:45


 1/19/20 12:44 DC  





 


 Lorazepam


  (Ativan)  0.5 mg  TID


 PO


   1/20/20 16:00


 1/20/20 13:08 DC  





 


 Lorazepam


  (Ativan)  1 mg  Q6H


 PO


   1/19/20 22:00


 1/20/20 13:09 DC 1/20/20 10:33





 


 Lorazepam


  (Ativan)  1 mg  Q6H


 PO


   1/20/20 18:00


 1/22/20 08:26 DC 1/22/20 05:51





 


 Lorazepam


  (Ativan)  1 mg  TID


 PO


   1/22/20 16:00


    1/27/20 16:13





 


 Magnesium


 Hydroxide


  (Milk Of


 Magnesia)  30 ml  DAILYPRN  PRN


 PO


 CONSTIPATION  1/19/20 15:15


     





 


 Nicotine


  (Nicoderm Cq


 21mg)  1 patch  DAILY


 TD


   1/20/20 09:00


     





 


 Non-Formulary


 Medication


  (** See Comment


 Field Below **)  SEE


 COMMENTS


 SECTION  1T@10


 XX


   1/29/20 10:00


 1/27/20 10:37 DC  





 


 Non-Formulary


 Medication


  (** See Comment


 Field Below **)  SEE LABEL


 COMMENTS  DAILY


 XX


   1/27/20 09:00


     





 


 Olanzapine


  (ZyPREXA


 **ZYDIS**)  10 mg  TIDP  PRN


 PO


 ANXIETY/AGITATION  1/19/20 15:00


 1/21/20 15:16 DC 1/19/20 21:39





 


 Paliperidone


  (Invega)  3 mg  BID


 PO


   1/19/20 21:00


 1/20/20 12:58 DC 1/20/20 09:12





 


 Trazodone HCl


  (Desyrel)  50 mg  QHSP  PRN


 PO


 INSOMNIA  1/19/20 21:00


    1/19/20 21:39














Allergies


Coded Allergies:  


     No Known Allergies (Unverified , 1/19/20)











FRANCESCO GERMAIN DO               Feb 7, 2020 11:27

## 2020-02-08 RX ADMIN — Medication SCH DOSE: at 20:53

## 2020-02-08 RX ADMIN — NICOTINE SCH PATCH: 21 PATCH, EXTENDED RELEASE TRANSDERMAL at 08:35

## 2020-02-08 RX ADMIN — Medication SCH DOSE: at 08:35

## 2020-02-08 RX ADMIN — Medication SCH: at 08:35

## 2020-02-08 RX ADMIN — Medication SCH DOSE: at 15:01

## 2020-02-09 RX ADMIN — Medication SCH DOSE: at 15:41

## 2020-02-09 RX ADMIN — Medication SCH: at 09:00

## 2020-02-09 RX ADMIN — Medication SCH DOSE: at 20:37

## 2020-02-09 RX ADMIN — Medication SCH DOSE: at 09:00

## 2020-02-09 RX ADMIN — NICOTINE SCH PATCH: 21 PATCH, EXTENDED RELEASE TRANSDERMAL at 09:00

## 2020-02-10 RX ADMIN — Medication SCH: at 09:00

## 2020-02-10 RX ADMIN — Medication SCH DOSE: at 15:45

## 2020-02-10 RX ADMIN — Medication SCH DOSE: at 20:38

## 2020-02-10 RX ADMIN — NICOTINE SCH PATCH: 21 PATCH, EXTENDED RELEASE TRANSDERMAL at 09:00

## 2020-02-10 RX ADMIN — Medication SCH DOSE: at 09:00

## 2020-02-10 NOTE — MHIPNPDOC
[facility] Progress Note


Progress Note








Inpatient Progress Note


Rik Pringle


MRN: N/A


Date of Birth: N/A


Date of Service: 02/10/2020


History of Present Illness


Patient presented catatonic with a history of schizophrenia and significant 

mental health transfer from home.





Interval History


Narrative: The patient continues to be bizarre walking around the unit, 

continually asking various people for discharge.





Affective: Unknown.





Psychotic: Still disorganized, tangential and bizarre.





Anxiety: Unable to determine.





Eating and sleeping behaviors: Still disrupted, needs significant prompting for 

eating.





Group Attendance: None.





Medication Side effects: See ROS below.





Behavioral problems/significant events overnight: None reported.





Staff Report: Patient is still disorganized, walking around, asking to various 

passerby the same question in repeating loop.





Review Of Systems


Unable to determine due to patient's mental status.





Psychotherapy


None on this visit.





Vital Signs


Reviewed.





Mental Status Examination


General: Poor hygiene





Speech: Significantly slowed, repeats same phrase over and over





Thought processes: Appears linear at times





MSK: Psychomotor retardation





Thought content: Unknown





Abstract reasoning, and computation: Appears impaired





Description of associations: Appears impaired





Description of abnormal or psychotic thoughts: Unknown





Judgment: impaired





Insight: impaired





Orientation: Appears alert and able to interact





Cognition: Slowed





Recent and remote memory: Impaired





Attention span and concentration: Impaired





Fund of knowledge: Unknown





Mood: "I want to go home, I'm perfectly fine."





Affect: Profoundly flat and dysthymic





Diagnoses


Schizophrenia.





Catatonia.





Alcohol use disorder, unspecified.





Rash, unspecified.





Assessment and Plan


Schizophrenia: Continue to offer Abilify 10 mg nightly, patient not taking 

medications.





Catatonia: Continue Ativan, patient is able to feed.





Alcohol use disorder: Will continue Ativan for catatonia. Continue to screen for

any dysregulation consistent with alcohol withdrawal, as unknown amount of 

alcohol use prior.





Treatment over objection assessment completed. Patient is slated for evaluation 

today for second opinion. Treatment over objection has been completed for 

secondary evaluation. Patient's court date assigned for next Friday. 





Rash: Patient noncompliance. 





Disposition


The patient will have court next week Friday for treatment over objection. The 

patient has refused to take any medications and still quite psychotic, unable to

care for himself, and can barely say any other words than "I want to go home." 

He is not able to engage in any meaningful activities and can attend own ADLs to

such a degree that ***.





Time Spent


15 minutes.





Monday





Vital Signs





Vital Signs








  Date Time  Temp Pulse Resp B/P (MAP) Pulse Ox O2 Delivery O2 Flow Rate FiO2


 


2/9/20 09:30      Room Air  


 


2/4/20 16:04 98.5 73 18 107/68 (81)    











Current Medications





Current Medications








 Medications


  (Trade)  Dose


 Ordered  Sig/Nimo


 Route


 PRN Reason  Start Time


 Stop Time Status Last Admin


Dose Admin


 


 Acetaminophen


  (Tylenol Tab)  650 mg  Q6HP  PRN


 PO


 HEADACHE or DISCOMFORT  1/19/20 15:15


     





 


 Al Hydrox/Mg


 Hydrox/Simethicone


  (Mylanta)  30 ml  Q4HP  PRN


 PO


 HEARTBURN/INDIGESTION  1/19/20 15:15


     





 


 Amlodipine


 Besylate


  (Norvasc)  2.5 mg  DAILY


 PO


   1/20/20 09:00


 1/21/20 08:37 DC 1/21/20 08:30





 


 Amlodipine


 Besylate


  (Norvasc)  5 mg  DAILY


 PO


   1/22/20 09:00


 1/28/20 15:46 DC 1/27/20 09:30





 


 Amlodipine


 Besylate


  (Norvasc)  10 mg  DAILY


 PO


   1/29/20 09:00


     





 


 Aripiprazole


  (AbiLIFY)  5 mg  QHS


 PO


   1/27/20 21:00


     





 


 Clozapine


  (Clozaril)  25 mg  QHS


 PO


   1/22/20 21:00


 1/23/20 12:16 DC 1/22/20 21:33





 


 Emollient Cream


  (Vanicream)  APPLY TO


 AFFECTED


 AREAS  TID


 TOP


   2/3/20 21:00


     





 


 Home Med


  (Med Rec


 Complete!)    ASDIRECTED


 XX


   1/19/20 12:45


 1/19/20 12:44 DC  





 


 Lorazepam


  (Ativan)  0.5 mg  TID


 PO


   1/20/20 16:00


 1/20/20 13:08 DC  





 


 Lorazepam


  (Ativan)  1 mg  Q6H


 PO


   1/19/20 22:00


 1/20/20 13:09 DC 1/20/20 10:33





 


 Lorazepam


  (Ativan)  1 mg  Q6H


 PO


   1/20/20 18:00


 1/22/20 08:26 DC 1/22/20 05:51





 


 Lorazepam


  (Ativan)  1 mg  TID


 PO


   1/22/20 16:00


 2/9/20 14:51 DC 1/27/20 16:13





 


 Magnesium


 Hydroxide


  (Milk Of


 Magnesia)  30 ml  DAILYPRN  PRN


 PO


 CONSTIPATION  1/19/20 15:15


     





 


 Miscellaneous


  (Unresolved


 Clarification


 Entry)  SEE LABEL


 COMMENTS  DAILY


 XX


   2/9/20 09:00


 2/9/20 14:53 DC  





 


 Nicotine


  (Nicoderm Cq


 21mg)  1 patch  DAILY


 TD


   1/20/20 09:00


     





 


 Non-Formulary


 Medication


  (** See Comment


 Field Below **)  SEE


 COMMENTS


 SECTION  1T@10


 XX


   1/29/20 10:00


 1/27/20 10:37 DC  





 


 Non-Formulary


 Medication


  (** See Comment


 Field Below **)  SEE LABEL


 COMMENTS  DAILY


 XX


   1/27/20 09:00


     





 


 Olanzapine


  (ZyPREXA


 **ZYDIS**)  10 mg  TIDP  PRN


 PO


 ANXIETY/AGITATION  1/19/20 15:00


 1/21/20 15:16 DC 1/19/20 21:39





 


 Paliperidone


  (Invega)  3 mg  BID


 PO


   1/19/20 21:00


 1/20/20 12:58 DC 1/20/20 09:12





 


 Trazodone HCl


  (Desyrel)  50 mg  QHSP  PRN


 PO


 INSOMNIA  1/19/20 21:00


    1/19/20 21:39














Allergies


Coded Allergies:  


     No Known Allergies (Unverified , 1/19/20)











FRANCESCO GERMAIN DO              Feb 10, 2020 11:06

## 2020-02-11 RX ADMIN — Medication SCH DOSE: at 20:02

## 2020-02-11 RX ADMIN — Medication SCH DOSE: at 09:00

## 2020-02-11 RX ADMIN — Medication SCH DOSE: at 16:00

## 2020-02-11 RX ADMIN — Medication SCH: at 09:00

## 2020-02-11 RX ADMIN — NICOTINE SCH PATCH: 21 PATCH, EXTENDED RELEASE TRANSDERMAL at 09:00

## 2020-02-11 NOTE — MHIPNPDOC
[facility] Progress Note


Progress Note








Inpatient Progress Note


Rik Pringle


MRN: N/A


Date of Birth: N/A


Date of Service: 02/11/2020


History of Present Illness


Patient presented catatonic with a history of schizophrenia and significant 

mental health transfer from home.





Interval History


Narrative: The patient continues to walk around the unit, bizarre, very 

difficult to talk to, repetitive.





Affective: Unknown.





Psychotic: Still disorganized, tangential and bizarre.





Anxiety: Unable to determine.





Eating and sleeping behaviors: Still disrupted, needs significant prompting for 

eating.





Group Attendance: None.





Medication Side effects: See ROS below.





Behavioral problems/significant events overnight: None reported.





Staff Report: Patient is still disorganized, walking around, asking to various 

passerby the same question in repeating loop.





Review Of Systems


Unable to determine due to patient's mental status.





Psychotherapy


None on this visit.





Vital Signs


Reviewed.





Mental Status Examination


General: Poor hygiene





Speech: Significantly slowed, repeats same phrase over and over





Thought processes: Appears linear at times





MSK: Psychomotor retardation





Thought content: Unknown





Abstract reasoning, and computation: Appears impaired





Description of associations: Appears impaired





Description of abnormal or psychotic thoughts: Unknown





Judgment: impaired





Insight: impaired





Orientation: Appears alert and able to interact





Cognition: Slowed





Recent and remote memory: Impaired





Attention span and concentration: Impaired





Fund of knowledge: Unknown





Mood: "I want to go home, I'm perfectly fine."





Affect: Profoundly flat and dysthymic





Diagnoses


Schizophrenia.





Catatonia.





Alcohol use disorder, unspecified.





Rash, unspecified.





Assessment and Plan


Schizophrenia: Continue to offer Abilify 10 mg nightly, patient not taking 

medications.





Catatonia: Continue Ativan, patient is able to feed.





Alcohol use disorder: Will continue Ativan for catatonia. Continue to screen for

any dysregulation consistent with alcohol withdrawal, as unknown amount of 

alcohol use prior.





Treatment over objection assessment completed. Patient is slated for evaluation 

today for second opinion. Treatment over objection has been completed for 

secondary evaluation. Patient's court date assigned for next Friday. 





Rash: Patient noncompliance. 





Disposition


Patient will be taken to court this coming Friday for further disposition and 

potential treatment over objection.





Time Spent


15 minutes.





Tuesday





Vital Signs





Vital Signs








  Date Time  Temp Pulse Resp B/P (MAP) Pulse Ox O2 Delivery O2 Flow Rate FiO2


 


2/9/20 09:30      Room Air  











Current Medications





Current Medications








 Medications


  (Trade)  Dose


 Ordered  Sig/Nimo


 Route


 PRN Reason  Start Time


 Stop Time Status Last Admin


Dose Admin


 


 Acetaminophen


  (Tylenol Tab)  650 mg  Q6HP  PRN


 PO


 HEADACHE or DISCOMFORT  1/19/20 15:15


     





 


 Al Hydrox/Mg


 Hydrox/Simethicone


  (Mylanta)  30 ml  Q4HP  PRN


 PO


 HEARTBURN/INDIGESTION  1/19/20 15:15


     





 


 Amlodipine


 Besylate


  (Norvasc)  2.5 mg  DAILY


 PO


   1/20/20 09:00


 1/21/20 08:37 DC 1/21/20 08:30





 


 Amlodipine


 Besylate


  (Norvasc)  5 mg  DAILY


 PO


   1/22/20 09:00


 1/28/20 15:46 DC 1/27/20 09:30





 


 Amlodipine


 Besylate


  (Norvasc)  10 mg  DAILY


 PO


   1/29/20 09:00


     





 


 Aripiprazole


  (AbiLIFY)  5 mg  QHS


 PO


   1/27/20 21:00


     





 


 Clozapine


  (Clozaril)  25 mg  QHS


 PO


   1/22/20 21:00


 1/23/20 12:16 DC 1/22/20 21:33





 


 Emollient Cream


  (Vanicream)  APPLY TO


 AFFECTED


 AREAS  TID


 TOP


   2/3/20 21:00


     





 


 Home Med


  (Med Rec


 Complete!)    ASDIRECTED


 XX


   1/19/20 12:45


 1/19/20 12:44 DC  





 


 Lorazepam


  (Ativan)  0.5 mg  TID


 PO


   1/20/20 16:00


 1/20/20 13:08 DC  





 


 Lorazepam


  (Ativan)  1 mg  Q6H


 PO


   1/19/20 22:00


 1/20/20 13:09 DC 1/20/20 10:33





 


 Lorazepam


  (Ativan)  1 mg  Q6H


 PO


   1/20/20 18:00


 1/22/20 08:26 DC 1/22/20 05:51





 


 Lorazepam


  (Ativan)  1 mg  TID


 PO


   1/22/20 16:00


 2/9/20 14:51 DC 1/27/20 16:13





 


 Magnesium


 Hydroxide


  (Milk Of


 Magnesia)  30 ml  DAILYPRN  PRN


 PO


 CONSTIPATION  1/19/20 15:15


     





 


 Miscellaneous


  (Unresolved


 Clarification


 Entry)  SEE LABEL


 COMMENTS  DAILY


 XX


   2/9/20 09:00


 2/9/20 14:53 DC  





 


 Nicotine


  (Nicoderm Cq


 21mg)  1 patch  DAILY


 TD


   1/20/20 09:00


     





 


 Non-Formulary


 Medication


  (** See Comment


 Field Below **)  SEE


 COMMENTS


 SECTION  1T@10


 XX


   1/29/20 10:00


 1/27/20 10:37 DC  





 


 Non-Formulary


 Medication


  (** See Comment


 Field Below **)  SEE LABEL


 COMMENTS  DAILY


 XX


   1/27/20 09:00


     





 


 Olanzapine


  (ZyPREXA


 **ZYDIS**)  10 mg  TIDP  PRN


 PO


 ANXIETY/AGITATION  1/19/20 15:00


 1/21/20 15:16 DC 1/19/20 21:39





 


 Paliperidone


  (Invega)  3 mg  BID


 PO


   1/19/20 21:00


 1/20/20 12:58 DC 1/20/20 09:12





 


 Trazodone HCl


  (Desyrel)  50 mg  QHSP  PRN


 PO


 INSOMNIA  1/19/20 21:00


    1/19/20 21:39














Allergies


Coded Allergies:  


     No Known Allergies (Unverified , 1/19/20)











FRANCESCO GERMAIN DO              Feb 11, 2020 10:37

## 2020-02-12 RX ADMIN — Medication SCH: at 08:59

## 2020-02-12 RX ADMIN — Medication SCH DOSE: at 14:56

## 2020-02-12 RX ADMIN — Medication SCH DOSE: at 20:37

## 2020-02-12 RX ADMIN — NICOTINE SCH PATCH: 21 PATCH, EXTENDED RELEASE TRANSDERMAL at 08:59

## 2020-02-12 RX ADMIN — Medication SCH DOSE: at 08:59

## 2020-02-13 RX ADMIN — Medication SCH DOSE: at 15:20

## 2020-02-13 RX ADMIN — Medication SCH: at 08:58

## 2020-02-13 RX ADMIN — Medication SCH DOSE: at 21:00

## 2020-02-13 RX ADMIN — NICOTINE SCH PATCH: 21 PATCH, EXTENDED RELEASE TRANSDERMAL at 08:57

## 2020-02-13 RX ADMIN — Medication SCH DOSE: at 08:57

## 2020-02-13 NOTE — MHIPNPDOC
[facility] Progress Note


Progress Note








Inpatient Progress Note


Rik Pringle


MRN: N/[id number]


Date of Birth: N/A


Date of Service: 02/13/2020


History of Present Illness


Patient presented catatonic with a history of schizophrenia and significant 

mental health transfer from home.





Interval History


Narrative: The patient continues to walk around the unit, bizarre, very 

difficult to talk to, repetitive.





Affective: Unknown.





Psychotic: Still disorganized, tangential and bizarre.





Anxiety: Unable to determine.





Eating and sleeping behaviors: Still disrupted, needs significant prompting for 

eating.





Group Attendance: None.





Medication Side effects: See ROS below.





Behavioral problems/significant events overnight: None reported.





Staff Report: Patient is still disorganized, walking around, asking to various 

passerby the same question in repeating loop.





Change narrative to the patient met with include team today the patient 

continues to report that he wants to go home makes bizarre movements throughout 

the discussion continually repeats the same motions or an over change effective 

to unknown chainsaw psychotic to be very disorganized tangential with bizarre 

movements





Review Of Systems


Denies any pain or discomfort at this time.





Psychotherapy


None on this visit.





Vital Signs


Reviewed.





Mental Status Examination


General: Poor hygiene





Speech: Significantly slowed, repeats same phrase over and over





Thought processes: Appears linear at times





MSK: Psychomotor retardation





Thought content: Unknown





Abstract reasoning, and computation: Appears impaired





Description of associations: Appears impaired





Description of abnormal or psychotic thoughts: Unknown





Judgment: impaired





Insight: impaired





Orientation: Appears alert and able to interact





Cognition: Slowed





Recent and remote memory: Impaired





Attention span and concentration: Impaired





Fund of knowledge: Unknown





Mood: "I want to go home, I'm perfectly fine."





Affect: Profoundly flat and dysthymic





Diagnoses


Schizophrenia.





Catatonia.





Alcohol use disorder, unspecified.





Rash, unspecified.





Assessment and Plan


Schizophrenia: Continue to offer Abilify 10 mg nightly, patient not taking 

medications.





Catatonia: Continue Ativan, patient is able to feed.





Alcohol use disorder: Will continue Ativan for catatonia. Continue to screen for

any dysregulation consistent with alcohol withdrawal, as unknown amount of 

alcohol use prior.





Treatment over objection assessment completed. Patient is slated for evaluation 

today for second opinion. Treatment over objection has been completed for 

secondary evaluation. Patient's court date assigned for next Friday. 





Rash: Patient noncompliance. 





Disposition


Treatment of her objection will be tomorrow, hearing at 2:00 PM.





Time Spent


15 minutes.





Thursday





Vital Signs





Vital Signs








  Date Time  Temp Pulse Resp B/P (MAP) Pulse Ox O2 Delivery O2 Flow Rate FiO2


 


2/9/20 09:30      Room Air  











Current Medications





Current Medications








 Medications


  (Trade)  Dose


 Ordered  Sig/Nimo


 Route


 PRN Reason  Start Time


 Stop Time Status Last Admin


Dose Admin


 


 Acetaminophen


  (Tylenol Tab)  650 mg  Q6HP  PRN


 PO


 HEADACHE or DISCOMFORT  1/19/20 15:15


     





 


 Al Hydrox/Mg


 Hydrox/Simethicone


  (Mylanta)  30 ml  Q4HP  PRN


 PO


 HEARTBURN/INDIGESTION  1/19/20 15:15


     





 


 Amlodipine


 Besylate


  (Norvasc)  2.5 mg  DAILY


 PO


   1/20/20 09:00


 1/21/20 08:37 DC 1/21/20 08:30





 


 Amlodipine


 Besylate


  (Norvasc)  5 mg  DAILY


 PO


   1/22/20 09:00


 1/28/20 15:46 DC 1/27/20 09:30





 


 Amlodipine


 Besylate


  (Norvasc)  10 mg  DAILY


 PO


   1/29/20 09:00


     





 


 Aripiprazole


  (AbiLIFY)  5 mg  QHS


 PO


   1/27/20 21:00


     





 


 Clozapine


  (Clozaril)  25 mg  QHS


 PO


   1/22/20 21:00


 1/23/20 12:16 DC 1/22/20 21:33





 


 Emollient Cream


  (Vanicream)  APPLY TO


 AFFECTED


 AREAS  TID


 TOP


   2/3/20 21:00


     





 


 Home Med


  (Med Rec


 Complete!)    ASDIRECTED


 XX


   1/19/20 12:45


 1/19/20 12:44 DC  





 


 Lorazepam


  (Ativan)  0.5 mg  TID


 PO


   1/20/20 16:00


 1/20/20 13:08 DC  





 


 Lorazepam


  (Ativan)  1 mg  Q6H


 PO


   1/19/20 22:00


 1/20/20 13:09 DC 1/20/20 10:33





 


 Lorazepam


  (Ativan)  1 mg  Q6H


 PO


   1/20/20 18:00


 1/22/20 08:26 DC 1/22/20 05:51





 


 Lorazepam


  (Ativan)  1 mg  TID


 PO


   1/22/20 16:00


 2/9/20 14:51 DC 1/27/20 16:13





 


 Magnesium


 Hydroxide


  (Milk Of


 Magnesia)  30 ml  DAILYPRN  PRN


 PO


 CONSTIPATION  1/19/20 15:15


     





 


 Miscellaneous


  (Unresolved


 Clarification


 Entry)  SEE LABEL


 COMMENTS  DAILY


 XX


   2/9/20 09:00


 2/9/20 14:53 DC  





 


 Nicotine


  (Nicoderm Cq


 21mg)  1 patch  DAILY


 TD


   1/20/20 09:00


     





 


 Non-Formulary


 Medication


  (** See Comment


 Field Below **)  SEE


 COMMENTS


 SECTION  1T@10


 XX


   1/29/20 10:00


 1/27/20 10:37 DC  





 


 Non-Formulary


 Medication


  (** See Comment


 Field Below **)  SEE LABEL


 COMMENTS  DAILY


 XX


   1/27/20 09:00


     





 


 Olanzapine


  (ZyPREXA


 **ZYDIS**)  10 mg  TIDP  PRN


 PO


 ANXIETY/AGITATION  1/19/20 15:00


 1/21/20 15:16 DC 1/19/20 21:39





 


 Paliperidone


  (Invega)  3 mg  BID


 PO


   1/19/20 21:00


 1/20/20 12:58 DC 1/20/20 09:12





 


 Trazodone HCl


  (Desyrel)  50 mg  QHSP  PRN


 PO


 INSOMNIA  1/19/20 21:00


    1/19/20 21:39














Allergies


Coded Allergies:  


     No Known Allergies (Unverified , 1/19/20)











FRANCESCO GERMAIN DO              Feb 13, 2020 08:22

## 2020-02-14 RX ADMIN — NICOTINE SCH PATCH: 21 PATCH, EXTENDED RELEASE TRANSDERMAL at 09:00

## 2020-02-14 RX ADMIN — Medication SCH DOSE: at 21:00

## 2020-02-14 RX ADMIN — Medication SCH: at 09:00

## 2020-02-14 RX ADMIN — Medication SCH DOSE: at 09:00

## 2020-02-14 RX ADMIN — Medication SCH DOSE: at 15:48

## 2020-02-14 NOTE — MHIPNPDOC
[facility] Progress Note


Progress Note








Inpatient Progress Note


Rik Pringle


MRN: N/A


Date of Birth: N/A


Date of Service: 02/14/2020


History of Present Illness


Patient presented catatonic with a history of schizophrenia and significant 

mental health transfer from home.





Interval History


Narrative: The patient continues to walk around the unit, bizarre, refuses to go

to court to participate in court hearing today. Patient will be retained and 

treat over objection.





Affective: Unknown.





Psychotic: Still disorganized, tangential and bizarre.





Anxiety: Unable to determine.





Eating and sleeping behaviors: Still disrupted, needs significant prompting for 

eating.





Group Attendance: None.





Medication Side effects: See ROS below.





Behavioral problems/significant events overnight: None reported.





Staff Report: Patient is still disorganized, walking around, asking to various 

passerby the same question in repeating loop.





Change narrative to the patient met with include team today the patient 

continues to report that he wants to go home makes bizarre movements throughout 

the discussion continually repeats the same motions or an over change effective 

to unknown chainsaw psychotic to be very disorganized tangential with bizarre 

movements





Review Of Systems


Denies any pain or discomfort at this time.





Psychotherapy


None on this visit.





Vital Signs


Reviewed.





Mental Status Examination


General: Poor hygiene





Speech: Significantly slowed, repeats same phrase over and over





Thought processes: Appears linear at times





MSK: Psychomotor retardation





Thought content: Unknown





Abstract reasoning, and computation: Appears impaired





Description of associations: Appears impaired





Description of abnormal or psychotic thoughts: Unknown





Judgment: impaired





Insight: impaired





Orientation: Appears alert and able to interact





Cognition: Slowed





Recent and remote memory: Impaired





Attention span and concentration: Impaired





Fund of knowledge: Unknown





Mood: "I want to go home, I'm perfectly fine."





Affect: Profoundly flat and dysthymic





Diagnoses


Schizophrenia.





Catatonia.





Alcohol use disorder, unspecified.





Rash, unspecified.





Assessment and Plan


Schizophrenia: Continue to offer Abilify 10 mg nightly, patient not taking 

medications.





Catatonia: Continue Ativan, patient is able to feed.





Alcohol use disorder: Will continue Ativan for catatonia. Continue to screen for

any dysregulation consistent with alcohol withdrawal, as unknown amount of 

alcohol use prior.





Patient is slated for evaluation for second opinion. Patient is going to be 

treated over objection. We will wait for legal papers to arrive next Tuesday. 





Rash: Patient noncompliance.





Disposition


The patient will be continued on his involuntary stay and treat over his 

objection. He will likely need long-term treatment and referral will be made to 

[facility] at this time.





Time Spent


15 minutes.





Friday





Vital Signs





Vital Signs








  Date Time  Temp Pulse Resp B/P (MAP) Pulse Ox O2 Delivery O2 Flow Rate FiO2


 


2/9/20 09:30      Room Air  











Current Medications





Current Medications








 Medications


  (Trade)  Dose


 Ordered  Sig/Nimo


 Route


 PRN Reason  Start Time


 Stop Time Status Last Admin


Dose Admin


 


 Acetaminophen


  (Tylenol Tab)  650 mg  Q6HP  PRN


 PO


 HEADACHE or DISCOMFORT  1/19/20 15:15


     





 


 Al Hydrox/Mg


 Hydrox/Simethicone


  (Mylanta)  30 ml  Q4HP  PRN


 PO


 HEARTBURN/INDIGESTION  1/19/20 15:15


     





 


 Amlodipine


 Besylate


  (Norvasc)  2.5 mg  DAILY


 PO


   1/20/20 09:00


 1/21/20 08:37 DC 1/21/20 08:30





 


 Amlodipine


 Besylate


  (Norvasc)  5 mg  DAILY


 PO


   1/22/20 09:00


 1/28/20 15:46 DC 1/27/20 09:30





 


 Amlodipine


 Besylate


  (Norvasc)  10 mg  DAILY


 PO


   1/29/20 09:00


     





 


 Aripiprazole


  (AbiLIFY)  5 mg  QHS


 PO


   1/27/20 21:00


     





 


 Clozapine


  (Clozaril)  25 mg  QHS


 PO


   1/22/20 21:00


 1/23/20 12:16 DC 1/22/20 21:33





 


 Emollient Cream


  (Vanicream)  APPLY TO


 AFFECTED


 AREAS  TID


 TOP


   2/3/20 21:00


     





 


 Home Med


  (Med Rec


 Complete!)    ASDIRECTED


 XX


   1/19/20 12:45


 1/19/20 12:44 DC  





 


 Lorazepam


  (Ativan)  0.5 mg  TID


 PO


   1/20/20 16:00


 1/20/20 13:08 DC  





 


 Lorazepam


  (Ativan)  1 mg  Q6H


 PO


   1/19/20 22:00


 1/20/20 13:09 DC 1/20/20 10:33





 


 Lorazepam


  (Ativan)  1 mg  Q6H


 PO


   1/20/20 18:00


 1/22/20 08:26 DC 1/22/20 05:51





 


 Lorazepam


  (Ativan)  1 mg  TID


 PO


   1/22/20 16:00


 2/9/20 14:51 DC 1/27/20 16:13





 


 Magnesium


 Hydroxide


  (Milk Of


 Magnesia)  30 ml  DAILYPRN  PRN


 PO


 CONSTIPATION  1/19/20 15:15


     





 


 Miscellaneous


  (Unresolved


 Clarification


 Entry)  SEE LABEL


 COMMENTS  DAILY


 XX


   2/9/20 09:00


 2/9/20 14:53 DC  





 


 Nicotine


  (Nicoderm Cq


 21mg)  1 patch  DAILY


 TD


   1/20/20 09:00


     





 


 Non-Formulary


 Medication


  (** See Comment


 Field Below **)  SEE


 COMMENTS


 SECTION  1T@10


 XX


   1/29/20 10:00


 1/27/20 10:37 DC  





 


 Non-Formulary


 Medication


  (** See Comment


 Field Below **)  SEE LABEL


 COMMENTS  DAILY


 XX


   1/27/20 09:00


     





 


 Olanzapine


  (ZyPREXA


 **ZYDIS**)  10 mg  TIDP  PRN


 PO


 ANXIETY/AGITATION  1/19/20 15:00


 1/21/20 15:16 DC 1/19/20 21:39





 


 Paliperidone


  (Invega)  3 mg  BID


 PO


   1/19/20 21:00


 1/20/20 12:58 DC 1/20/20 09:12





 


 Trazodone HCl


  (Desyrel)  50 mg  QHSP  PRN


 PO


 INSOMNIA  1/19/20 21:00


    1/19/20 21:39














Allergies


Coded Allergies:  


     No Known Allergies (Unverified , 1/19/20)











FRANCESCO GERMAIN DO              Feb 14, 2020 08:25

## 2020-02-15 RX ADMIN — Medication SCH DOSE: at 08:38

## 2020-02-15 RX ADMIN — NICOTINE SCH PATCH: 21 PATCH, EXTENDED RELEASE TRANSDERMAL at 08:38

## 2020-02-15 RX ADMIN — Medication SCH: at 08:38

## 2020-02-15 RX ADMIN — Medication SCH DOSE: at 15:16

## 2020-02-15 RX ADMIN — Medication SCH DOSE: at 20:19

## 2020-02-15 NOTE — MHIPNPDOC
[facility] Progress Note


Progress Note








Inpatient Progress Note


Rik Pringle


MRN: N/A


Date of Birth: N/A


Date of Service: 02/15/2020


History of Present Illness


Patient presented catatonic with a history of schizophrenia and significant 

mental health transfer from home.





Interval History


Narrative: The patient continues to bizarre asking for discharge, unable to 

ascertain court treatment.





Affective: Unknown.





Psychotic: Still disorganized, tangential and bizarre.





Anxiety: Unable to determine.





Eating and sleeping behaviors: Still disrupted, needs significant prompting for 

eating.





Group Attendance: None.





Medication Side effects: See ROS below.





Behavioral problems/significant events overnight: None reported.





Staff Report: Patient is still disorganized, walking around, asking to various 

passerby the same question in repeating loop.





Change narrative to the patient met with include team today the patient 

continues to report that he wants to go home makes bizarre movements throughout 

the discussion continually repeats the same motions or an over change effective 

to unknown chainsaw psychotic to be very disorganized tangential with bizarre 

movements.





Review Of Systems


Denies any pain or discomfort at this time.





Psychotherapy


None on this visit.





Vital Signs


Reviewed.





Mental Status Examination


General: Poor hygiene





Speech: Significantly slowed, repeats same phrase over and over





Thought processes: Appears linear at times





MSK: Psychomotor retardation





Thought content: Unknown





Abstract reasoning, and computation: Appears impaired





Description of associations: Appears impaired





Description of abnormal or psychotic thoughts: Unknown





Judgment: impaired





Insight: impaired





Orientation: Appears alert and able to interact





Cognition: Slowed





Recent and remote memory: Impaired





Attention span and concentration: Impaired





Fund of knowledge: Unknown





Mood: "I want to go home, I'm perfectly fine."





Affect: Profoundly flat and dysthymic





Diagnoses


Schizophrenia.





Catatonia.





Alcohol use disorder, unspecified.





Rash, unspecified.





Assessment and Plan


Schizophrenia: Continue Abilify 10 mg nightly. Treatment over objection order 

will be placed shortly once legal papers are served on Tuesday.





Catatonia: Continue Ativan, patient is able to feed.





Alcohol use disorder: Will continue Ativan for catatonia. Continue to screen for

any dysregulation consistent with alcohol withdrawal, as unknown amount of 

alcohol use prior.





Rash: Patient noncompliance.





Disposition


The patient will be continued on his involuntary stay and treat over his 

objection. He will likely need long-term treatment and referral will be made to 

[facility] at this time.





Time Spent


15 minutes.





Saturday





Vital Signs





Vital Signs








  Date Time  Temp Pulse Resp B/P (MAP) Pulse Ox O2 Delivery O2 Flow Rate FiO2


 


2/9/20 09:30      Room Air  











Current Medications





Current Medications








 Medications


  (Trade)  Dose


 Ordered  Sig/Nimo


 Route


 PRN Reason  Start Time


 Stop Time Status Last Admin


Dose Admin


 


 Acetaminophen


  (Tylenol Tab)  650 mg  Q6HP  PRN


 PO


 HEADACHE or DISCOMFORT  1/19/20 15:15


     





 


 Al Hydrox/Mg


 Hydrox/Simethicone


  (Mylanta)  30 ml  Q4HP  PRN


 PO


 HEARTBURN/INDIGESTION  1/19/20 15:15


     





 


 Amlodipine


 Besylate


  (Norvasc)  2.5 mg  DAILY


 PO


   1/20/20 09:00


 1/21/20 08:37 DC 1/21/20 08:30





 


 Amlodipine


 Besylate


  (Norvasc)  5 mg  DAILY


 PO


   1/22/20 09:00


 1/28/20 15:46 DC 1/27/20 09:30





 


 Amlodipine


 Besylate


  (Norvasc)  10 mg  DAILY


 PO


   1/29/20 09:00


     





 


 Aripiprazole


  (AbiLIFY)  5 mg  QHS


 PO


   1/27/20 21:00


     





 


 Clozapine


  (Clozaril)  25 mg  QHS


 PO


   1/22/20 21:00


 1/23/20 12:16 DC 1/22/20 21:33





 


 Emollient Cream


  (Vanicream)  APPLY TO


 AFFECTED


 AREAS  TID


 TOP


   2/3/20 21:00


     





 


 Home Med


  (Med Rec


 Complete!)    ASDIRECTED


 XX


   1/19/20 12:45


 1/19/20 12:44 DC  





 


 Lorazepam


  (Ativan)  0.5 mg  TID


 PO


   1/20/20 16:00


 1/20/20 13:08 DC  





 


 Lorazepam


  (Ativan)  1 mg  Q6H


 PO


   1/19/20 22:00


 1/20/20 13:09 DC 1/20/20 10:33





 


 Lorazepam


  (Ativan)  1 mg  Q6H


 PO


   1/20/20 18:00


 1/22/20 08:26 DC 1/22/20 05:51





 


 Lorazepam


  (Ativan)  1 mg  TID


 PO


   1/22/20 16:00


 2/9/20 14:51 DC 1/27/20 16:13





 


 Magnesium


 Hydroxide


  (Milk Of


 Magnesia)  30 ml  DAILYPRN  PRN


 PO


 CONSTIPATION  1/19/20 15:15


     





 


 Miscellaneous


  (Unresolved


 Clarification


 Entry)  SEE LABEL


 COMMENTS  DAILY


 XX


   2/9/20 09:00


 2/9/20 14:53 DC  





 


 Nicotine


  (Nicoderm Cq


 21mg)  1 patch  DAILY


 TD


   1/20/20 09:00


     





 


 Non-Formulary


 Medication


  (** See Comment


 Field Below **)  SEE


 COMMENTS


 SECTION  1T@10


 XX


   1/29/20 10:00


 1/27/20 10:37 DC  





 


 Non-Formulary


 Medication


  (** See Comment


 Field Below **)  SEE LABEL


 COMMENTS  DAILY


 XX


   1/27/20 09:00


     





 


 Olanzapine


  (ZyPREXA


 **ZYDIS**)  10 mg  TIDP  PRN


 PO


 ANXIETY/AGITATION  1/19/20 15:00


 1/21/20 15:16 DC 1/19/20 21:39





 


 Paliperidone


  (Invega)  3 mg  BID


 PO


   1/19/20 21:00


 1/20/20 12:58 DC 1/20/20 09:12





 


 Trazodone HCl


  (Desyrel)  50 mg  QHSP  PRN


 PO


 INSOMNIA  1/19/20 21:00


    1/19/20 21:39














Allergies


Coded Allergies:  


     No Known Allergies (Unverified , 1/19/20)











FRANCESCO GERMAIN DO              Feb 15, 2020 11:23

## 2020-02-16 RX ADMIN — Medication SCH: at 08:20

## 2020-02-16 RX ADMIN — NICOTINE SCH PATCH: 21 PATCH, EXTENDED RELEASE TRANSDERMAL at 08:20

## 2020-02-16 RX ADMIN — Medication SCH DOSE: at 16:00

## 2020-02-16 RX ADMIN — Medication SCH DOSE: at 20:28

## 2020-02-16 RX ADMIN — Medication SCH DOSE: at 08:20

## 2020-02-17 RX ADMIN — Medication SCH DOSE: at 16:00

## 2020-02-17 RX ADMIN — NICOTINE SCH PATCH: 21 PATCH, EXTENDED RELEASE TRANSDERMAL at 09:00

## 2020-02-17 RX ADMIN — Medication SCH: at 09:00

## 2020-02-17 RX ADMIN — Medication SCH DOSE: at 09:00

## 2020-02-17 RX ADMIN — Medication SCH DOSE: at 20:53

## 2020-02-17 NOTE — MHIPNPDOC
[facility] Progress Note


Progress Note








Inpatient Progress Note


Rik Pringle


MRN: N/A


Date of Birth: N/A


Date of Service: 02/17/2020


History of Present Illness


Patient presented catatonic with a history of schizophrenia and significant 

mental health transfer from home.





Interval History


Narrative: The patient continues to be bizarre asking for discharge, no ability 

to engage about core treatment.





Affective: Unknown.





Psychotic: Still disorganized, tangential and bizarre.





Anxiety: Unable to determine.





Eating and sleeping behaviors: Still disrupted, needs significant prompting for 

eating.





Group Attendance: None.





Medication Side effects: See ROS below.





Behavioral problems/significant events overnight: None reported.





Staff Report: Patient is still disorganized, walking around, asking to various 

passerby the same question in repeating loop.





Review Of Systems


Denies any pain or discomfort at this time.





Psychotherapy


None on this visit.





Vital Signs


Reviewed.





Mental Status Examination


General: Poor hygiene





Speech: Significantly slowed, repeats same phrase over and over





Thought processes: Appears linear at times





MSK: Psychomotor retardation





Thought content: Unknown





Abstract reasoning, and computation: Appears impaired





Description of associations: Appears impaired





Description of abnormal or psychotic thoughts: Unknown





Judgment: impaired





Insight: impaired





Orientation: Appears alert and able to interact





Cognition: Slowed





Recent and remote memory: Impaired





Attention span and concentration: Impaired





Fund of knowledge: Unknown





Mood: "I want to go home, I'm perfectly fine."





Affect: Profoundly flat and dysthymic





Diagnoses


Schizophrenia.





Catatonia.





Alcohol use disorder, unspecified.





Rash, unspecified.





Assessment and Plan


Schizophrenia: Continue Abilify 10 mg nightly. Treatment over objection order 

will be placed shortly once legal papers are served on Tuesday.





Catatonia: Continue Ativan, patient is able to feed.





Alcohol use disorder: Will continue Ativan for catatonia. Continue to screen for

any dysregulation consistent with alcohol withdrawal, as unknown amount of 

alcohol use prior.





Rash: Patient noncompliance.





Disposition


The patient will be continued on his involuntary stay and treat over his 

objection. He will likely need long-term treatment and referral will be made to 

[facility] at this time.





Time Spent


15 minutes.





Monday





Current Medications





Current Medications








 Medications


  (Trade)  Dose


 Ordered  Sig/Nimo


 Route


 PRN Reason  Start Time


 Stop Time Status Last Admin


Dose Admin


 


 Acetaminophen


  (Tylenol Tab)  650 mg  Q6HP  PRN


 PO


 HEADACHE or DISCOMFORT  1/19/20 15:15


     





 


 Al Hydrox/Mg


 Hydrox/Simethicone


  (Mylanta)  30 ml  Q4HP  PRN


 PO


 HEARTBURN/INDIGESTION  1/19/20 15:15


     





 


 Amlodipine


 Besylate


  (Norvasc)  2.5 mg  DAILY


 PO


   1/20/20 09:00


 1/21/20 08:37 DC 1/21/20 08:30





 


 Amlodipine


 Besylate


  (Norvasc)  5 mg  DAILY


 PO


   1/22/20 09:00


 1/28/20 15:46 DC 1/27/20 09:30





 


 Amlodipine


 Besylate


  (Norvasc)  10 mg  DAILY


 PO


   1/29/20 09:00


     





 


 Aripiprazole


  (AbiLIFY)  5 mg  QHS


 PO


   1/27/20 21:00


     





 


 Clozapine


  (Clozaril)  25 mg  QHS


 PO


   1/22/20 21:00


 1/23/20 12:16 DC 1/22/20 21:33





 


 Emollient Cream


  (Vanicream)  APPLY TO


 AFFECTED


 AREAS  TID


 TOP


   2/3/20 21:00


     





 


 Home Med


  (Med Rec


 Complete!)    ASDIRECTED


 XX


   1/19/20 12:45


 1/19/20 12:44 DC  





 


 Lorazepam


  (Ativan)  0.5 mg  TID


 PO


   1/20/20 16:00


 1/20/20 13:08 DC  





 


 Lorazepam


  (Ativan)  1 mg  Q6H


 PO


   1/19/20 22:00


 1/20/20 13:09 DC 1/20/20 10:33





 


 Lorazepam


  (Ativan)  1 mg  Q6H


 PO


   1/20/20 18:00


 1/22/20 08:26 DC 1/22/20 05:51





 


 Lorazepam


  (Ativan)  1 mg  TID


 PO


   1/22/20 16:00


 2/9/20 14:51 DC 1/27/20 16:13





 


 Magnesium


 Hydroxide


  (Milk Of


 Magnesia)  30 ml  DAILYPRN  PRN


 PO


 CONSTIPATION  1/19/20 15:15


     





 


 Miscellaneous


  (Unresolved


 Clarification


 Entry)  SEE LABEL


 COMMENTS  DAILY


 XX


   2/9/20 09:00


 2/9/20 14:53 DC  





 


 Nicotine


  (Nicoderm Cq


 21mg)  1 patch  DAILY


 TD


   1/20/20 09:00


     





 


 Non-Formulary


 Medication


  (** See Comment


 Field Below **)  SEE


 COMMENTS


 SECTION  1T@10


 XX


   1/29/20 10:00


 1/27/20 10:37 DC  





 


 Non-Formulary


 Medication


  (** See Comment


 Field Below **)  SEE LABEL


 COMMENTS  DAILY


 XX


   1/27/20 09:00


     





 


 Olanzapine


  (ZyPREXA


 **ZYDIS**)  10 mg  TIDP  PRN


 PO


 ANXIETY/AGITATION  1/19/20 15:00


 1/21/20 15:16 DC 1/19/20 21:39





 


 Paliperidone


  (Invega)  3 mg  BID


 PO


   1/19/20 21:00


 1/20/20 12:58 DC 1/20/20 09:12





 


 Trazodone HCl


  (Desyrel)  50 mg  QHSP  PRN


 PO


 INSOMNIA  1/19/20 21:00


    1/19/20 21:39














Allergies


Coded Allergies:  


     No Known Allergies (Unverified , 1/19/20)











FRANCESCO GERMAIN DO              Feb 17, 2020 08:30

## 2020-02-18 RX ADMIN — NICOTINE SCH PATCH: 21 PATCH, EXTENDED RELEASE TRANSDERMAL at 09:00

## 2020-02-18 RX ADMIN — Medication SCH: at 09:00

## 2020-02-18 RX ADMIN — Medication SCH DOSE: at 20:52

## 2020-02-18 RX ADMIN — Medication SCH DOSE: at 09:00

## 2020-02-18 RX ADMIN — Medication SCH DOSE: at 15:31

## 2020-02-18 NOTE — MHIPNPDOC
[facility] Progress Note


Progress Note








Inpatient Progress Note


Rik Pringle


MRN: N/A


Date of Birth: N/A


Date of Service: 02/18/2020


History of Present Illness


Patient presented catatonic with a history of schizophrenia and significant 

mental health transfer from home.





Interval History


Narrative: The patient was given his first dose of Haldol against his will, 

continues to report he does not need treatment.





Affective: Unknown.





Psychotic: Still disorganized, tangential and bizarre.





Anxiety: Unable to determine.





Eating and sleeping behaviors: Still disrupted, needs significant prompting for 

eating.





Group Attendance: None.





Medication Side effects: See ROS below.





Behavioral problems/significant events overnight: None reported.





Staff Report: Patient is still disorganized, walking around, asking to various 

passerby the same question in repeating loop.





Review Of Systems


Denies any pain or discomfort at this time.





Psychotherapy


None on this visit.





Vital Signs


Reviewed.





Mental Status Examination


General: Some improvement, shaved at times.





Speech: Significantly slowed, repeats same phrase over and over





Thought processes: Appears linear at times





MSK: Psychomotor retardation





Thought content: Unknown





Abstract reasoning, and computation: Appears impaired





Description of associations: Appears impaired





Description of abnormal or psychotic thoughts: Unknown





Judgment: impaired





Insight: impaired





Orientation: Appears alert and able to interact





Cognition: Slowed





Recent and remote memory: Impaired





Attention span and concentration: Impaired





Fund of knowledge: Unknown





Mood: "I want to go home, I'm perfectly fine."





Affect: Profoundly flat and dysthymic





Diagnoses


Schizophrenia.





Catatonia.





Alcohol use disorder, unspecified.





Rash, unspecified.





Assessment and Plan


Schizophrenia: Continue Abilify 10 mg daily. We'll give Haldol 10 mg IM if 

refused.





Catatonia: Ativan will be continued.





Alcohol use disorder: Unlikely relevant at this time.





Rash: Patient showered, appears to be improving.





Disposition


The patient will be continued on his involuntary stay and treat over his 

objection. He will likely need long-term treatment and referral will be made to 

[facility] at this time.





Time Spent


15 minutes.





Tuesday





Current Medications





Current Medications








 Medications


  (Trade)  Dose


 Ordered  Sig/Nimo


 Route


 PRN Reason  Start Time


 Stop Time Status Last Admin


Dose Admin


 


 Acetaminophen


  (Tylenol Tab)  650 mg  Q6HP  PRN


 PO


 HEADACHE or DISCOMFORT  1/19/20 15:15


     





 


 Al Hydrox/Mg


 Hydrox/Simethicone


  (Mylanta)  30 ml  Q4HP  PRN


 PO


 HEARTBURN/INDIGESTION  1/19/20 15:15


     





 


 Amlodipine


 Besylate


  (Norvasc)  2.5 mg  DAILY


 PO


   1/20/20 09:00


 1/21/20 08:37 DC 1/21/20 08:30





 


 Amlodipine


 Besylate


  (Norvasc)  5 mg  DAILY


 PO


   1/22/20 09:00


 1/28/20 15:46 DC 1/27/20 09:30





 


 Amlodipine


 Besylate


  (Norvasc)  10 mg  DAILY


 PO


   1/29/20 09:00


     





 


 Aripiprazole


  (AbiLIFY)  5 mg  QHS


 PO


   1/27/20 21:00


     





 


 Clozapine


  (Clozaril)  25 mg  QHS


 PO


   1/22/20 21:00


 1/23/20 12:16 DC 1/22/20 21:33





 


 Emollient Cream


  (Vanicream)  APPLY TO


 AFFECTED


 AREAS  TID


 TOP


   2/3/20 21:00


     





 


 Home Med


  (Med Rec


 Complete!)    ASDIRECTED


 XX


   1/19/20 12:45


 1/19/20 12:44 DC  





 


 Lorazepam


  (Ativan)  0.5 mg  TID


 PO


   1/20/20 16:00


 1/20/20 13:08 DC  





 


 Lorazepam


  (Ativan)  1 mg  Q6H


 PO


   1/19/20 22:00


 1/20/20 13:09 DC 1/20/20 10:33





 


 Lorazepam


  (Ativan)  1 mg  Q6H


 PO


   1/20/20 18:00


 1/22/20 08:26 DC 1/22/20 05:51





 


 Lorazepam


  (Ativan)  1 mg  TID


 PO


   1/22/20 16:00


 2/9/20 14:51 DC 1/27/20 16:13





 


 Magnesium


 Hydroxide


  (Milk Of


 Magnesia)  30 ml  DAILYPRN  PRN


 PO


 CONSTIPATION  1/19/20 15:15


     





 


 Miscellaneous


  (Unresolved


 Clarification


 Entry)  SEE LABEL


 COMMENTS  DAILY


 XX


   2/9/20 09:00


 2/9/20 14:53 DC  





 


 Nicotine


  (Nicoderm Cq


 21mg)  1 patch  DAILY


 TD


   1/20/20 09:00


     





 


 Non-Formulary


 Medication


  (** See Comment


 Field Below **)  SEE


 COMMENTS


 SECTION  1T@10


 XX


   1/29/20 10:00


 1/27/20 10:37 DC  





 


 Non-Formulary


 Medication


  (** See Comment


 Field Below **)  SEE LABEL


 COMMENTS  DAILY


 XX


   1/27/20 09:00


     





 


 Olanzapine


  (ZyPREXA


 **ZYDIS**)  10 mg  TIDP  PRN


 PO


 ANXIETY/AGITATION  1/19/20 15:00


 1/21/20 15:16 DC 1/19/20 21:39





 


 Paliperidone


  (Invega)  3 mg  BID


 PO


   1/19/20 21:00


 1/20/20 12:58 DC 1/20/20 09:12





 


 Trazodone HCl


  (Desyrel)  50 mg  QHSP  PRN


 PO


 INSOMNIA  1/19/20 21:00


    1/19/20 21:39














Allergies


Coded Allergies:  


     No Known Allergies (Unverified , 1/19/20)











FRANCESCO GERMAIN DO              Feb 18, 2020 09:42

## 2020-02-19 RX ADMIN — Medication SCH DOSE: at 08:39

## 2020-02-19 RX ADMIN — Medication SCH: at 08:39

## 2020-02-19 RX ADMIN — Medication SCH DOSE: at 20:41

## 2020-02-19 RX ADMIN — Medication SCH DOSE: at 15:19

## 2020-02-19 RX ADMIN — NICOTINE SCH PATCH: 21 PATCH, EXTENDED RELEASE TRANSDERMAL at 08:38

## 2020-02-19 NOTE — MHIPNPDOC
[facility] Progress Note


Progress Note








Inpatient Progress Note


Rik Pringle


MRN: N/A


Date of Birth: N/A


Date of Service: 02/19/2020


History of Present Illness


Patient presented catatonic with a history of schizophrenia and significant 

mental health transfer from home.





Interval History


Narrative: The patient was given his dose of Haldol against his will yesterday, 

took his first dose of Abilify willingly today, does not take any other 

medications.





Affective: Unknown.





Psychotic: Still disorganized, tangential and bizarre.





Anxiety: Unable to determine.





Eating and sleeping behaviors: Still disrupted, needs significant prompting for 

eating.





Group Attendance: None.





Medication Side effects: See ROS below.





Behavioral problems/significant events overnight: None reported.





Staff Report: The patient has showered, is less obsessive with staff.





Review Of Systems


Denies any pain or discomfort at this time.





Psychotherapy


None on this visit.





Vital Signs


Reviewed.





Mental Status Examination


General: Some improvement, shaved at times.





Speech: Significantly slowed, repeats same phrase over and over





Thought processes: Appears linear at times





MSK: Psychomotor retardation





Thought content: Unknown





Abstract reasoning, and computation: Appears impaired





Description of associations: Appears impaired





Description of abnormal or psychotic thoughts: Unknown





Judgment: impaired





Insight: impaired





Orientation: Appears alert and able to interact





Cognition: Slowed





Recent and remote memory: Impaired





Attention span and concentration: Impaired





Fund of knowledge: Unknown





Mood: "I want to go home, I'm perfectly fine."





Affect: Profoundly flat and dysthymic





Diagnoses


Schizophrenia.





Catatonia.





Alcohol use disorder, unspecified.





Rash, unspecified.





Assessment and Plan


Schizophrenia: Continue Abilify 10 mg daily. We'll give Haldol 10 mg IM if 

refused.





Catatonia: Ativan will be continued.





Alcohol use disorder: Unlikely relevant at this time.





Rash: Patient showered, appears to be improving.





Disposition


The patient will be continued on his involuntary stay and treat over his 

objection. He will likely need long-term treatment and referral will be made to 

[facility] at this time.





Time Spent


15 minutes.





Wednesday





Vital Signs





Vital Signs








  Date Time  Temp Pulse Resp B/P (MAP) Pulse Ox O2 Delivery O2 Flow Rate FiO2


 


2/19/20 07:58      Room Air  











Current Medications





Current Medications








 Medications


  (Trade)  Dose


 Ordered  Sig/Nimo


 Route


 PRN Reason  Start Time


 Stop Time Status Last Admin


Dose Admin


 


 Acetaminophen


  (Tylenol Tab)  650 mg  Q6HP  PRN


 PO


 HEADACHE or DISCOMFORT  1/19/20 15:15


     





 


 Al Hydrox/Mg


 Hydrox/Simethicone


  (Mylanta)  30 ml  Q4HP  PRN


 PO


 HEARTBURN/INDIGESTION  1/19/20 15:15


     





 


 Amlodipine


 Besylate


  (Norvasc)  2.5 mg  DAILY


 PO


   1/20/20 09:00


 1/21/20 08:37 DC 1/21/20 08:30





 


 Amlodipine


 Besylate


  (Norvasc)  5 mg  DAILY


 PO


   1/22/20 09:00


 1/28/20 15:46 DC 1/27/20 09:30





 


 Amlodipine


 Besylate


  (Norvasc)  10 mg  DAILY


 PO


   1/29/20 09:00


     





 


 Aripiprazole


  (AbiLIFY)  5 mg  DAILY


 PO


   2/18/20 09:00


 2/19/20 08:51 DC 2/19/20 08:38





 


 Aripiprazole


  (AbiLIFY)  5 mg  QHS


 PO


   1/27/20 21:00


 2/18/20 10:50 DC  





 


 Aripiprazole


  (AbiLIFY)  10 mg  DAILY


 PO


   2/20/20 09:00


     





 


 Benztropine


 Mesylate


  (Cogentin)  0.25 mg  Q6HP  PRN


 PO


 eps  2/18/20 13:00


     





 


 Clozapine


  (Clozaril)  25 mg  QHS


 PO


   1/22/20 21:00


 1/23/20 12:16 DC 1/22/20 21:33





 


 Emollient Cream


  (Vanicream)  APPLY TO


 AFFECTED


 AREAS  TID


 TOP


   2/3/20 21:00


     





 


 Haloperidol


  (Haldol)  10 mg  DAILY  PRN


 IM


 REFUSES ORAL ABILIFY  2/18/20 11:30


    2/18/20 11:47





 


 Haloperidol


  (Haldol)  10 mg  QHS  PRN


 IM


   2/18/20 11:00


 2/18/20 11:17 DC  





 


 Home Med


  (Med Rec


 Complete!)    ASDIRECTED


 XX


   1/19/20 12:45


 1/19/20 12:44 DC  





 


 Lorazepam


  (Ativan)  0.5 mg  TID


 PO


   1/20/20 16:00


 1/20/20 13:08 DC  





 


 Lorazepam


  (Ativan)  1 mg  Q6H


 PO


   1/19/20 22:00


 1/20/20 13:09 DC 1/20/20 10:33





 


 Lorazepam


  (Ativan)  1 mg  Q6H


 PO


   1/20/20 18:00


 1/22/20 08:26 DC 1/22/20 05:51





 


 Lorazepam


  (Ativan)  1 mg  TID


 PO


   1/22/20 16:00


 2/9/20 14:51 DC 1/27/20 16:13





 


 Magnesium


 Hydroxide


  (Milk Of


 Magnesia)  30 ml  DAILYPRN  PRN


 PO


 CONSTIPATION  1/19/20 15:15


     





 


 Miscellaneous


  (Unresolved


 Clarification


 Entry)  SEE LABEL


 COMMENTS  DAILY


 XX


   2/9/20 09:00


 2/9/20 14:53 DC  





 


 Nicotine


  (Nicoderm Cq


 21mg)  1 patch  DAILY


 TD


   1/20/20 09:00


     





 


 Non-Formulary


 Medication


  (** See Comment


 Field Below **)  SEE


 COMMENTS


 SECTION  1T@10


 XX


   1/29/20 10:00


 1/27/20 10:37 DC  





 


 Non-Formulary


 Medication


  (** See Comment


 Field Below **)  SEE LABEL


 COMMENTS  DAILY


 XX


   1/27/20 09:00


     





 


 Olanzapine


  (ZyPREXA


 **ZYDIS**)  10 mg  TIDP  PRN


 PO


 ANXIETY/AGITATION  1/19/20 15:00


 1/21/20 15:16 DC 1/19/20 21:39





 


 Paliperidone


  (Invega)  3 mg  BID


 PO


   1/19/20 21:00


 1/20/20 12:58 DC 1/20/20 09:12





 


 Trazodone HCl


  (Desyrel)  50 mg  QHSP  PRN


 PO


 INSOMNIA  1/19/20 21:00


    1/19/20 21:39














Allergies


Coded Allergies:  


     No Known Allergies (Unverified , 1/19/20)











FRANCESCO GERMAIN DO              Feb 19, 2020 09:54

## 2020-02-20 VITALS — SYSTOLIC BLOOD PRESSURE: 123 MMHG | DIASTOLIC BLOOD PRESSURE: 78 MMHG

## 2020-02-20 RX ADMIN — Medication SCH DOSE: at 09:00

## 2020-02-20 RX ADMIN — Medication SCH DOSE: at 20:36

## 2020-02-20 RX ADMIN — Medication SCH DOSE: at 16:00

## 2020-02-20 RX ADMIN — NICOTINE SCH PATCH: 21 PATCH, EXTENDED RELEASE TRANSDERMAL at 09:00

## 2020-02-20 RX ADMIN — Medication SCH: at 09:00

## 2020-02-20 NOTE — MHIPNPDOC
[facility] Progress Note


Progress Note








Inpatient Progress Note


Rik Pringle


MRN: N/A


Date of Birth: N/A


Date of Service: 02/20/2020


History of Present Illness


Patient presented catatonic with a history of schizophrenia and significant 

mental health transfer from home.





Interval History


Narrative: The patient was noted to take his medications without refusing. He 

has showered and made some improvements. The patient still repeating same 

phrases, unable to engage in any in-depth review of systems.





Affective: Unknown.





Psychotic: Still disorganized, tangential and bizarre.





Anxiety: Unable to determine.





Eating and sleeping behaviors: Still needs prompting for eating, unable to fill 

out menu, still sleeps the majority of the day.





Group Attendance: None.





Medication Side effects: See ROS below.





Behavioral problems/significant events overnight: None reported.





Staff Report: The patient still obsessive at times repeating same phrase, unable

to engage in any meaningful discussion.





Review Of Systems


Denies any pain or discomfort at this time.





Psychotherapy


None on this visit.





Vital Signs


Reviewed.





Mental Status Examination


General: Some improvement, shaved at times.





Speech: Significantly slowed, repeats same phrase over and over





Thought processes: Appears linear at times





MSK: Psychomotor retardation





Thought content: Unknown





Abstract reasoning, and computation: Appears impaired





Description of associations: Appears impaired





Description of abnormal or psychotic thoughts: Unknown





Judgment: impaired





Insight: impaired





Orientation: Appears alert and able to interact





Cognition: Slowed





Recent and remote memory: Impaired





Attention span and concentration: Impaired





Fund of knowledge: Unknown





Mood: "I want to go home, I'm perfectly fine."





Affect: Profoundly flat and dysthymic





Diagnoses


Schizophrenia.





Catatonia.





Alcohol use disorder, unspecified.





Rash, unspecified.





Assessment and Plan


Schizophrenia: Continue Abilify 10 mg daily, making small improvements.





Catatonia: The patient has been refusing.





Alcohol use disorder: Not relevant at this time.





Rash: Patient's rash appears to be improving after shower.





Disposition


The patient will need a further inpatient admission as he is still quite 

impaired. He is unable to feed himself without prompting, although he has made a

simple gesture of showering. He will still need more observation and treatment 

as he is very far from his baseline. He currently does not have a home to return

to. If he does not improve as his brother has made it quite clear that due to 

him not taking meds, he will be removed from the home.





Time Spent


15 minutes.





Thursday





Vital Signs





Vital Signs








  Date Time  Temp Pulse Resp B/P (MAP) Pulse Ox O2 Delivery O2 Flow Rate FiO2


 


2/20/20 09:03      Room Air  











Current Medications





Current Medications








 Medications


  (Trade)  Dose


 Ordered  Sig/Nimo


 Route


 PRN Reason  Start Time


 Stop Time Status Last Admin


Dose Admin


 


 Acetaminophen


  (Tylenol Tab)  650 mg  Q6HP  PRN


 PO


 HEADACHE or DISCOMFORT  1/19/20 15:15


     





 


 Al Hydrox/Mg


 Hydrox/Simethicone


  (Mylanta)  30 ml  Q4HP  PRN


 PO


 HEARTBURN/INDIGESTION  1/19/20 15:15


     





 


 Amlodipine


 Besylate


  (Norvasc)  2.5 mg  DAILY


 PO


   1/20/20 09:00


 1/21/20 08:37 DC 1/21/20 08:30





 


 Amlodipine


 Besylate


  (Norvasc)  5 mg  DAILY


 PO


   1/22/20 09:00


 1/28/20 15:46 DC 1/27/20 09:30





 


 Amlodipine


 Besylate


  (Norvasc)  10 mg  DAILY


 PO


   1/29/20 09:00


     





 


 Aripiprazole


  (AbiLIFY)  5 mg  DAILY


 PO


   2/18/20 09:00


 2/19/20 08:51 DC 2/19/20 08:38





 


 Aripiprazole


  (AbiLIFY)  5 mg  QHS


 PO


   1/27/20 21:00


 2/18/20 10:50 DC  





 


 Aripiprazole


  (AbiLIFY)  10 mg  DAILY


 PO


   2/20/20 09:00


     





 


 Benztropine


 Mesylate


  (Cogentin)  0.25 mg  Q6HP  PRN


 PO


 eps  2/18/20 13:00


     





 


 Clozapine


  (Clozaril)  25 mg  QHS


 PO


   1/22/20 21:00


 1/23/20 12:16 DC 1/22/20 21:33





 


 Emollient Cream


  (Vanicream)  APPLY TO


 AFFECTED


 AREAS  TID


 TOP


   2/3/20 21:00


     





 


 Haloperidol


  (Haldol)  10 mg  DAILY  PRN


 IM


 REFUSES ORAL ABILIFY  2/18/20 11:30


    2/18/20 11:47





 


 Haloperidol


  (Haldol)  10 mg  QHS  PRN


 IM


   2/18/20 11:00


 2/18/20 11:17 DC  





 


 Home Med


  (Med Rec


 Complete!)    ASDIRECTED


 XX


   1/19/20 12:45


 1/19/20 12:44 DC  





 


 Lorazepam


  (Ativan)  0.5 mg  TID


 PO


   1/20/20 16:00


 1/20/20 13:08 DC  





 


 Lorazepam


  (Ativan)  1 mg  Q6H


 PO


   1/19/20 22:00


 1/20/20 13:09 DC 1/20/20 10:33





 


 Lorazepam


  (Ativan)  1 mg  Q6H


 PO


   1/20/20 18:00


 1/22/20 08:26 DC 1/22/20 05:51





 


 Lorazepam


  (Ativan)  1 mg  TID


 PO


   1/22/20 16:00


 2/9/20 14:51 DC 1/27/20 16:13





 


 Magnesium


 Hydroxide


  (Milk Of


 Magnesia)  30 ml  DAILYPRN  PRN


 PO


 CONSTIPATION  1/19/20 15:15


     





 


 Miscellaneous


  (Unresolved


 Clarification


 Entry)  SEE LABEL


 COMMENTS  DAILY


 XX


   2/9/20 09:00


 2/9/20 14:53 DC  





 


 Nicotine


  (Nicoderm Cq


 21mg)  1 patch  DAILY


 TD


   1/20/20 09:00


     





 


 Non-Formulary


 Medication


  (** See Comment


 Field Below **)  SEE


 COMMENTS


 SECTION  1T@10


 XX


   1/29/20 10:00


 1/27/20 10:37 DC  





 


 Non-Formulary


 Medication


  (** See Comment


 Field Below **)  SEE LABEL


 COMMENTS  DAILY


 XX


   1/27/20 09:00


     





 


 Olanzapine


  (ZyPREXA


 **ZYDIS**)  10 mg  TIDP  PRN


 PO


 ANXIETY/AGITATION  1/19/20 15:00


 1/21/20 15:16 DC 1/19/20 21:39





 


 Paliperidone


  (Invega)  3 mg  BID


 PO


   1/19/20 21:00


 1/20/20 12:58 DC 1/20/20 09:12





 


 Trazodone HCl


  (Desyrel)  50 mg  QHSP  PRN


 PO


 INSOMNIA  1/19/20 21:00


    1/19/20 21:39














Allergies


Coded Allergies:  


     No Known Allergies (Unverified , 1/19/20)











FRANCESCO GERMAIN DO              Feb 20, 2020 09:15

## 2020-02-21 RX ADMIN — Medication SCH: at 09:00

## 2020-02-21 RX ADMIN — NICOTINE SCH PATCH: 21 PATCH, EXTENDED RELEASE TRANSDERMAL at 09:00

## 2020-02-21 RX ADMIN — Medication SCH DOSE: at 09:00

## 2020-02-21 RX ADMIN — Medication SCH DOSE: at 21:00

## 2020-02-21 RX ADMIN — Medication SCH DOSE: at 15:33

## 2020-02-21 NOTE — MHIPNPDOC
[facility] Progress Note


Progress Note








Inpatient Progress Note


Rik Pringle


MRN: N/A


Date of Birth: N/A


Date of Service: 02/21/2020


History of Present Illness


Patient presented catatonic with a history of schizophrenia and significant 

mental health transfer from home.





Interval History


Narrative: The patient is met with. He was lying in bed. Some better hygiene, 

continues to repeat various same phrases.





Affective: Unknown.





Psychotic: Patient still quite disorganized, tangential but less bizarre.





Anxiety: Unable to determine.





Eating and sleeping behaviors: Still needs prompting for eating, unable to fill 

out menu, still sleeps the majority of the day.





Group Attendance: None.





Medication Side effects: See ROS below.





Behavioral problems/significant events overnight: None reported.





Staff Report: The patient still obsessive at times repeating same phrase, unable

to engage in any meaningful discussion.





Review Of Systems


Denies any pain or discomfort at this time.





Psychotherapy


None on this visit.





Vital Signs


Reviewed.





Mental Status Examination


General: Some improvement, shaved at times.





Speech: Significantly slowed, repeats same phrase over and over





Thought processes: Appears linear at times





MSK: Psychomotor retardation





Thought content: Unknown





Abstract reasoning, and computation: Appears impaired





Description of associations: Appears impaired





Description of abnormal or psychotic thoughts: Unknown





Judgment: impaired





Insight: impaired





Orientation: Appears alert and able to interact





Cognition: Slowed





Recent and remote memory: Impaired





Attention span and concentration: Impaired





Fund of knowledge: Unknown





Mood: "I want to go home, I'm perfectly fine."





Affect: Profoundly flat and dysthymic





Diagnoses


Schizophrenia.





Catatonia.





Alcohol use disorder, unspecified.





Rash, unspecified.





Assessment and Plan


Schizophrenia: Increase Abilify to 15 mg daily, making small improvement.





Catatonia: The patient has been refusing.





Alcohol use disorder: Not relevant at this time.





Rash: Patient's rash appears to be improving after shower.





Disposition


The patient will need a further inpatient admission as he is still quite 

impaired. He is unable to feed himself without prompting, although he has made a

simple gesture of showering. He will still need more observation and treatment 

as he is very far from his baseline. He currently does not have a home to return

to. If he does not improve as his brother has made it quite clear that due to 

him not taking meds, he will be removed from the home.





Time Spent


15 minutes.





Friday





Vital Signs





Vital Signs








  Date Time  Temp Pulse Resp B/P (MAP) Pulse Ox O2 Delivery O2 Flow Rate FiO2


 


2/20/20 17:26 98.8 68 16 123/78 (93)    


 


2/20/20 09:03      Room Air  











Current Medications





Current Medications








 Medications


  (Trade)  Dose


 Ordered  Sig/Nimo


 Route


 PRN Reason  Start Time


 Stop Time Status Last Admin


Dose Admin


 


 Acetaminophen


  (Tylenol Tab)  650 mg  Q6HP  PRN


 PO


 HEADACHE or DISCOMFORT  1/19/20 15:15


     





 


 Al Hydrox/Mg


 Hydrox/Simethicone


  (Mylanta)  30 ml  Q4HP  PRN


 PO


 HEARTBURN/INDIGESTION  1/19/20 15:15


     





 


 Amlodipine


 Besylate


  (Norvasc)  2.5 mg  DAILY


 PO


   1/20/20 09:00


 1/21/20 08:37 DC 1/21/20 08:30





 


 Amlodipine


 Besylate


  (Norvasc)  5 mg  DAILY


 PO


   1/22/20 09:00


 1/28/20 15:46 DC 1/27/20 09:30





 


 Amlodipine


 Besylate


  (Norvasc)  10 mg  DAILY


 PO


   1/29/20 09:00


     





 


 Aripiprazole


  (AbiLIFY)  5 mg  DAILY


 PO


   2/18/20 09:00


 2/19/20 08:51 DC 2/19/20 08:38





 


 Aripiprazole


  (AbiLIFY)  5 mg  QHS


 PO


   1/27/20 21:00


 2/18/20 10:50 DC  





 


 Aripiprazole


  (AbiLIFY)  10 mg  DAILY


 PO


   2/20/20 09:00


    2/21/20 09:37





 


 Benztropine


 Mesylate


  (Cogentin)  0.25 mg  Q6HP  PRN


 PO


 eps  2/18/20 13:00


     





 


 Clozapine


  (Clozaril)  25 mg  QHS


 PO


   1/22/20 21:00


 1/23/20 12:16 DC 1/22/20 21:33





 


 Emollient Cream


  (Vanicream)  APPLY TO


 AFFECTED


 AREAS  TID


 TOP


   2/3/20 21:00


     





 


 Haloperidol


  (Haldol)  10 mg  DAILY  PRN


 IM


 REFUSES ORAL ABILIFY  2/18/20 11:30


    2/18/20 11:47





 


 Haloperidol


  (Haldol)  10 mg  QHS  PRN


 IM


   2/18/20 11:00


 2/18/20 11:17 DC  





 


 Home Med


  (Med Rec


 Complete!)    ASDIRECTED


 XX


   1/19/20 12:45


 1/19/20 12:44 DC  





 


 Lorazepam


  (Ativan)  0.5 mg  TID


 PO


   1/20/20 16:00


 1/20/20 13:08 DC  





 


 Lorazepam


  (Ativan)  1 mg  Q6H


 PO


   1/19/20 22:00


 1/20/20 13:09 DC 1/20/20 10:33





 


 Lorazepam


  (Ativan)  1 mg  Q6H


 PO


   1/20/20 18:00


 1/22/20 08:26 DC 1/22/20 05:51





 


 Lorazepam


  (Ativan)  1 mg  TID


 PO


   1/22/20 16:00


 2/9/20 14:51 DC 1/27/20 16:13





 


 Magnesium


 Hydroxide


  (Milk Of


 Magnesia)  30 ml  DAILYPRN  PRN


 PO


 CONSTIPATION  1/19/20 15:15


     





 


 Miscellaneous


  (Unresolved


 Clarification


 Entry)  SEE LABEL


 COMMENTS  DAILY


 XX


   2/9/20 09:00


 2/9/20 14:53 DC  





 


 Nicotine


  (Nicoderm Cq


 21mg)  1 patch  DAILY


 TD


   1/20/20 09:00


     





 


 Non-Formulary


 Medication


  (** See Comment


 Field Below **)  SEE


 COMMENTS


 SECTION  1T@10


 XX


   1/29/20 10:00


 1/27/20 10:37 DC  





 


 Non-Formulary


 Medication


  (** See Comment


 Field Below **)  SEE LABEL


 COMMENTS  DAILY


 XX


   1/27/20 09:00


     





 


 Olanzapine


  (ZyPREXA


 **ZYDIS**)  10 mg  TIDP  PRN


 PO


 ANXIETY/AGITATION  1/19/20 15:00


 1/21/20 15:16 DC 1/19/20 21:39





 


 Paliperidone


  (Invega)  3 mg  BID


 PO


   1/19/20 21:00


 1/20/20 12:58 DC 1/20/20 09:12





 


 Trazodone HCl


  (Desyrel)  50 mg  QHSP  PRN


 PO


 INSOMNIA  1/19/20 21:00


    1/19/20 21:39














Allergies


Coded Allergies:  


     No Known Allergies (Unverified , 1/19/20)











FRANCESCO GERMAIN DO              Feb 21, 2020 10:34

## 2020-02-22 RX ADMIN — Medication SCH: at 09:00

## 2020-02-22 RX ADMIN — Medication SCH DOSE: at 21:00

## 2020-02-22 RX ADMIN — Medication SCH DOSE: at 15:15

## 2020-02-22 RX ADMIN — NICOTINE SCH PATCH: 21 PATCH, EXTENDED RELEASE TRANSDERMAL at 09:00

## 2020-02-22 RX ADMIN — Medication SCH DOSE: at 09:00

## 2020-02-23 VITALS — DIASTOLIC BLOOD PRESSURE: 66 MMHG | SYSTOLIC BLOOD PRESSURE: 122 MMHG

## 2020-02-23 RX ADMIN — Medication SCH DOSE: at 20:11

## 2020-02-23 RX ADMIN — NICOTINE SCH PATCH: 21 PATCH, EXTENDED RELEASE TRANSDERMAL at 09:00

## 2020-02-23 RX ADMIN — Medication SCH DOSE: at 15:57

## 2020-02-23 RX ADMIN — Medication SCH: at 09:00

## 2020-02-23 RX ADMIN — Medication SCH DOSE: at 09:00

## 2020-02-24 RX ADMIN — Medication SCH: at 09:00

## 2020-02-24 RX ADMIN — NICOTINE SCH PATCH: 21 PATCH, EXTENDED RELEASE TRANSDERMAL at 09:00

## 2020-02-24 RX ADMIN — Medication SCH DOSE: at 09:00

## 2020-02-24 RX ADMIN — Medication SCH DOSE: at 15:53

## 2020-02-24 RX ADMIN — Medication SCH DOSE: at 20:14

## 2020-02-24 NOTE — MHIPNPDOC
[facility] Progress Note


Progress Note





Rik Pringle





Inpatient Progress Note


Rik Pringle


Select Gender


MRN: [id number]


Date of Birth: [birth year]


Date of Service: 02/24/2020


History of Present Illness


Patient presented catatonic with a history of schizophrenia and significant 

mental health transfer from home.





Interval History


Narrative: The patient is met with in the group setting. He has been getting 

some better hygiene, although has been uncooperative with any ADLs per nursing.





Affective: The patient continuously denies any symptoms grossly.





Psychotic: Patient is still disorganized and tangential at times but is notably 

less bizarre.





Anxiety: Unable to determine.





Eating and sleeping behaviors: The patient still needs prompting for eating as 

he is unable to fill out his menu and spends the majority of the day sleeping.





Group Attendance: None.





Medication Side effects: See ROS below.





Behavioral problems/significant events overnight: None reported.





Staff Report: The patient is still malodorous and unable to engage in ADLs 

without multiple staff members directing him to do so and engaging with him.





Review Of Systems


Denies all side effects grossly, does not engage in interview.





Psychotherapy


None on this visit.





Vital Signs


Reviewed.





Mental Status Examination


General: Appears to have shaved somewhat, hygiene mildly better.





Speech: Significantly slowed, repeats same phrase over and over





Thought processes: Appears linear at times





MSK: Psychomotor retardation





Thought content: Unknown





Abstract reasoning, and computation: Appears impaired





Description of associations: Appears impaired





Description of abnormal or psychotic thoughts: Unknown





Judgment: impaired





Insight: impaired





Orientation: Appears alert and able to interact





Cognition: Slowed





Recent and remote memory: Impaired





Attention span and concentration: Impaired





Fund of knowledge: Unknown





Mood: "I want to go home, I'm perfectly fine."





Affect: Profoundly flat and dysthymic.





Diagnoses


Schizophrenia.





Catatonia.





Alcohol use disorder, unspecified.





Rash, unspecified.





Assessment and Plan


Schizophrenia: Increase Abilify to 20 mg daily, making some small improvement.





Catatonia: The patient has been refusing.





Alcohol use disorder: Not relevant at this time.





Rash: Patient's rash appears to be improving after shower.





Disposition


The patient will need a further inpatient admission as he is still quite 

impaired. He is unable to feed himself without prompting, although he has made a

simple gesture of showering. He will still need more observation and treatment 

as he is very far from his baseline. He currently does not have a home to return

to. If he does not improve as his brother has made it quite clear that due to 

him not taking meds, he will be removed from the home.





Transfer hearing this Friday.





Time Spent


15 minutes.





Vital Signs





Vital Signs








  Date Time  Temp Pulse Resp B/P (MAP) Pulse Ox O2 Delivery O2 Flow Rate FiO2


 


2/23/20 16:00 98.9 52 16 122/66 (84)    


 


2/20/20 09:03      Room Air  











Current Medications





Current Medications








 Medications


  (Trade)  Dose


 Ordered  Sig/Nimo


 Route


 PRN Reason  Start Time


 Stop Time Status Last Admin


Dose Admin


 


 Acetaminophen


  (Tylenol Tab)  650 mg  Q6HP  PRN


 PO


 HEADACHE or DISCOMFORT  1/19/20 15:15


     





 


 Al Hydrox/Mg


 Hydrox/Simethicone


  (Mylanta)  30 ml  Q4HP  PRN


 PO


 HEARTBURN/INDIGESTION  1/19/20 15:15


     





 


 Amlodipine


 Besylate


  (Norvasc)  2.5 mg  DAILY


 PO


   1/20/20 09:00


 1/21/20 08:37 DC 1/21/20 08:30





 


 Amlodipine


 Besylate


  (Norvasc)  5 mg  DAILY


 PO


   1/22/20 09:00


 1/28/20 15:46 DC 1/27/20 09:30





 


 Amlodipine


 Besylate


  (Norvasc)  10 mg  DAILY


 PO


   1/29/20 09:00


     





 


 Aripiprazole


  (AbiLIFY)  5 mg  DAILY


 PO


   2/18/20 09:00


 2/19/20 08:51 DC 2/19/20 08:38





 


 Aripiprazole


  (AbiLIFY)  5 mg  QHS


 PO


   1/27/20 21:00


 2/18/20 10:50 DC  





 


 Aripiprazole


  (AbiLIFY)  10 mg  DAILY


 PO


   2/20/20 09:00


 2/21/20 13:38 DC 2/21/20 09:37





 


 Aripiprazole


  (AbiLIFY)  15 mg  DAILY


 PO


   2/22/20 09:00


    2/23/20 09:31





 


 Benztropine


 Mesylate


  (Cogentin)  0.25 mg  Q6HP  PRN


 PO


 eps  2/18/20 13:00


     





 


 Clozapine


  (Clozaril)  25 mg  QHS


 PO


   1/22/20 21:00


 1/23/20 12:16 DC 1/22/20 21:33





 


 Emollient Cream


  (Vanicream)  APPLY TO


 AFFECTED


 AREAS  TID


 TOP


   2/3/20 21:00


     





 


 Haloperidol


  (Haldol)  10 mg  DAILY  PRN


 IM


 REFUSES ORAL ABILIFY  2/18/20 11:30


    2/18/20 11:47





 


 Haloperidol


  (Haldol)  10 mg  QHS  PRN


 IM


   2/18/20 11:00


 2/18/20 11:17 DC  





 


 Home Med


  (Med Rec


 Complete!)    ASDIRECTED


 XX


   1/19/20 12:45


 1/19/20 12:44 DC  





 


 Lorazepam


  (Ativan)  0.5 mg  TID


 PO


   1/20/20 16:00


 1/20/20 13:08 DC  





 


 Lorazepam


  (Ativan)  1 mg  Q6H


 PO


   1/19/20 22:00


 1/20/20 13:09 DC 1/20/20 10:33





 


 Lorazepam


  (Ativan)  1 mg  Q6H


 PO


   1/20/20 18:00


 1/22/20 08:26 DC 1/22/20 05:51





 


 Lorazepam


  (Ativan)  1 mg  TID


 PO


   1/22/20 16:00


 2/9/20 14:51 DC 1/27/20 16:13





 


 Magnesium


 Hydroxide


  (Milk Of


 Magnesia)  30 ml  DAILYPRN  PRN


 PO


 CONSTIPATION  1/19/20 15:15


     





 


 Miscellaneous


  (Unresolved


 Clarification


 Entry)  SEE LABEL


 COMMENTS  DAILY


 XX


   2/9/20 09:00


 2/9/20 14:53 DC  





 


 Nicotine


  (Nicoderm Cq


 21mg)  1 patch  DAILY


 TD


   1/20/20 09:00


     





 


 Non-Formulary


 Medication


  (** See Comment


 Field Below **)  SEE


 COMMENTS


 SECTION  1T@10


 XX


   1/29/20 10:00


 1/27/20 10:37 DC  





 


 Non-Formulary


 Medication


  (** See Comment


 Field Below **)  SEE LABEL


 COMMENTS  DAILY


 XX


   1/27/20 09:00


     





 


 Olanzapine


  (ZyPREXA


 **ZYDIS**)  10 mg  TIDP  PRN


 PO


 ANXIETY/AGITATION  1/19/20 15:00


 1/21/20 15:16 DC 1/19/20 21:39





 


 Paliperidone


  (Invega)  3 mg  BID


 PO


   1/19/20 21:00


 1/20/20 12:58 DC 1/20/20 09:12





 


 Trazodone HCl


  (Desyrel)  50 mg  QHSP  PRN


 PO


 INSOMNIA  1/19/20 21:00


    1/19/20 21:39














Allergies


Coded Allergies:  


     No Known Allergies (Unverified , 1/19/20)











FRANCESCO GERMAIN DO              Feb 24, 2020 08:42

## 2020-02-25 VITALS — SYSTOLIC BLOOD PRESSURE: 116 MMHG | DIASTOLIC BLOOD PRESSURE: 74 MMHG

## 2020-02-25 RX ADMIN — Medication SCH: at 08:40

## 2020-02-25 RX ADMIN — Medication SCH DOSE: at 15:08

## 2020-02-25 RX ADMIN — NICOTINE SCH PATCH: 21 PATCH, EXTENDED RELEASE TRANSDERMAL at 08:39

## 2020-02-25 RX ADMIN — Medication SCH DOSE: at 20:24

## 2020-02-25 RX ADMIN — Medication SCH DOSE: at 08:39

## 2020-02-26 VITALS — SYSTOLIC BLOOD PRESSURE: 137 MMHG | DIASTOLIC BLOOD PRESSURE: 83 MMHG

## 2020-02-26 RX ADMIN — Medication SCH DOSE: at 15:32

## 2020-02-26 RX ADMIN — NICOTINE SCH PATCH: 21 PATCH, EXTENDED RELEASE TRANSDERMAL at 09:00

## 2020-02-26 RX ADMIN — Medication SCH DOSE: at 20:55

## 2020-02-26 RX ADMIN — Medication SCH DOSE: at 09:21

## 2020-02-26 RX ADMIN — Medication SCH: at 09:00

## 2020-02-26 NOTE — MHIPNPDOC
[facility] Progress Note


Progress Note





Rik Pringle





Inpatient Progress Note


Rik Pringle


Select Gender


MRN: N/[id number]


Date of Birth: [birth year]YYYY


Date of Service: 02/26/2020


History of Present Illness


Patient presented catatonic with a history of schizophrenia and significant 

mental health transfer from home.





Interval History


Narrative: The patient is met within the group setting. He is brighter and has 

better hygiene, more interactive than previous, able to engage in more thorough 

interview.





Affective: The patient grossly denies.





Psychotic: The patient is much less disorganized, more able to attend the needs,

more pleasant.





Anxiety: Denies any.





Eating and sleeping behaviors: To appears to be normalizing and able to take mor

e control of his ADLs.





Group Attendance: Infrequent, but more than previous.





Medication Side effects: See ROS below.





Behavioral problems/significant events overnight: None reported.





Staff Report: The patient is making some improvement and appears to be 

normalizing. He is brighter and more able to engage.











Review Of Systems


General: Denies fever or appetite changes 





Cardiovascular: Denies Chest pain or palpations





GI: Denies Nausea, vomiting, or bowel changes





Respiratory: Denies shortness of breath or cough





Neuro: Denies dizziness, tremors





Derm: Denies any rashes or pruritus





: Denies any dysuria or urinary problems 





MSK: Denies any muscle tightness or stiffness





HEENT: Denies any vision changes or headaches











Psychotherapy


None on this visit.





Vital Signs


Reviewed.





Mental Status Examination


General: Much improved hygiene.





Speech: More fluid.





Thought processes: Linear and logical





MSK:  No major signs of involuntary movements beyond some back movements that 

appear improved.





Thought content: Future orientated





Abstract reasoning, and computation: Intact





Description of associations: Intact





Description of abnormal or psychotic thoughts: Denies any suicidal or homicidal 

ideation. Denies any auditory or visual hallucinations. Does not appear to be 

responding to internal stimuli. Does not appear to be endorsing any bizarre or 

paranoid ideation.





Judgment: Improved.





Insight: Improved.





Orientation: Alert and orientated 3





Cognition: Grossly normal





Recent and remote memory: Intact





Attention span and concentration: Intact





Fund of knowledge: Adequate





Mood: "okay"





Affect: More bright and euthymic.





Diagnoses


Schizophrenia.





Catatonia.





Alcohol use disorder, unspecified.





Rash, unspecified.





Assessment and Plan


Schizophrenia: Continue Abilify 20 mg daily, it appears to be helping quite a 

bit. We'll give patient court ordered Abilify Maintena 400 mg.





Catatonia: The patient has been refusing.





Alcohol use disorder: Not relevant at this time.





Rash: Patient's rash appears to be improving after shower.











Disposition


Patient will be triaged for potential discharge. We'll need to get a hold of 

family, to determine if he has housing, as he has been improving well enough 

that he may not need long-term treatment at this time.





Time Spent


15 minutes.





Vital Signs





Vital Signs








  Date Time  Temp Pulse Resp B/P (MAP) Pulse Ox O2 Delivery O2 Flow Rate FiO2


 


2/26/20 08:11      Room Air  


 


2/25/20 16:00 99.3 65 16 116/74 (88)    











Current Medications





Current Medications








 Medications


  (Trade)  Dose


 Ordered  Sig/Nimo


 Route


 PRN Reason  Start Time


 Stop Time Status Last Admin


Dose Admin


 


 Acetaminophen


  (Tylenol Tab)  650 mg  Q6HP  PRN


 PO


 HEADACHE or DISCOMFORT  1/19/20 15:15


     





 


 Al Hydrox/Mg


 Hydrox/Simethicone


  (Mylanta)  30 ml  Q4HP  PRN


 PO


 HEARTBURN/INDIGESTION  1/19/20 15:15


     





 


 Amlodipine


 Besylate


  (Norvasc)  2.5 mg  DAILY


 PO


   1/20/20 09:00


 1/21/20 08:37 DC 1/21/20 08:30





 


 Amlodipine


 Besylate


  (Norvasc)  5 mg  DAILY


 PO


   1/22/20 09:00


 1/28/20 15:46 DC 1/27/20 09:30





 


 Amlodipine


 Besylate


  (Norvasc)  10 mg  DAILY


 PO


   1/29/20 09:00


     





 


 Aripiprazole


  (AbiLIFY)  5 mg  DAILY


 PO


   2/18/20 09:00


 2/19/20 08:51 DC 2/19/20 08:38





 


 Aripiprazole


  (AbiLIFY)  5 mg  QHS


 PO


   1/27/20 21:00


 2/18/20 10:50 DC  





 


 Aripiprazole


  (AbiLIFY)  10 mg  DAILY


 PO


   2/20/20 09:00


 2/21/20 13:38 DC 2/21/20 09:37





 


 Aripiprazole


  (AbiLIFY)  15 mg  DAILY


 PO


   2/22/20 09:00


 2/24/20 09:04 DC 2/23/20 09:31





 


 Aripiprazole


  (AbiLIFY)  20 mg  DAILY


 PO


   2/24/20 09:00


    2/25/20 08:38





 


 Benztropine


 Mesylate


  (Cogentin)  0.25 mg  Q6HP  PRN


 PO


 eps  2/18/20 13:00


     





 


 Clozapine


  (Clozaril)  25 mg  QHS


 PO


   1/22/20 21:00


 1/23/20 12:16 DC 1/22/20 21:33





 


 Emollient Cream


  (Vanicream)  APPLY TO


 AFFECTED


 AREAS  TID


 TOP


   2/3/20 21:00


     





 


 Haloperidol


  (Haldol)  10 mg  DAILY  PRN


 IM


 REFUSES ORAL ABILIFY  2/18/20 11:30


    2/18/20 11:47





 


 Haloperidol


  (Haldol)  10 mg  QHS  PRN


 IM


   2/18/20 11:00


 2/18/20 11:17 DC  





 


 Home Med


  (Med Rec


 Complete!)    ASDIRECTED


 XX


   1/19/20 12:45


 1/19/20 12:44 DC  





 


 Lorazepam


  (Ativan)  0.5 mg  TID


 PO


   1/20/20 16:00


 1/20/20 13:08 DC  





 


 Lorazepam


  (Ativan)  1 mg  Q6H


 PO


   1/19/20 22:00


 1/20/20 13:09 DC 1/20/20 10:33





 


 Lorazepam


  (Ativan)  1 mg  Q6H


 PO


   1/20/20 18:00


 1/22/20 08:26 DC 1/22/20 05:51





 


 Lorazepam


  (Ativan)  1 mg  TID


 PO


   1/22/20 16:00


 2/9/20 14:51 DC 1/27/20 16:13





 


 Magnesium


 Hydroxide


  (Milk Of


 Magnesia)  30 ml  DAILYPRN  PRN


 PO


 CONSTIPATION  1/19/20 15:15


     





 


 Miscellaneous


  (Unresolved


 Clarification


 Entry)  SEE LABEL


 COMMENTS  DAILY


 XX


   2/9/20 09:00


 2/9/20 14:53 DC  





 


 Nicotine


  (Nicoderm Cq


 21mg)  1 patch  DAILY


 TD


   1/20/20 09:00


     





 


 Non-Formulary


 Medication


  (** See Comment


 Field Below **)  SEE


 COMMENTS


 SECTION  1T@10


 XX


   1/29/20 10:00


 1/27/20 10:37 DC  





 


 Non-Formulary


 Medication


  (** See Comment


 Field Below **)  SEE LABEL


 COMMENTS  DAILY


 XX


   1/27/20 09:00


     





 


 Olanzapine


  (ZyPREXA


 **ZYDIS**)  10 mg  TIDP  PRN


 PO


 ANXIETY/AGITATION  1/19/20 15:00


 1/21/20 15:16 DC 1/19/20 21:39





 


 Paliperidone


  (Invega)  3 mg  BID


 PO


   1/19/20 21:00


 1/20/20 12:58 DC 1/20/20 09:12





 


 Trazodone HCl


  (Desyrel)  50 mg  QHSP  PRN


 PO


 INSOMNIA  1/19/20 21:00


    1/19/20 21:39














Allergies


Coded Allergies:  


     No Known Allergies (Unverified , 1/19/20)











FRANCESCO GERMAIN DO              Feb 26, 2020 09:17

## 2020-02-27 VITALS — SYSTOLIC BLOOD PRESSURE: 114 MMHG | DIASTOLIC BLOOD PRESSURE: 77 MMHG

## 2020-02-27 RX ADMIN — Medication SCH: at 09:00

## 2020-02-27 RX ADMIN — Medication SCH DOSE: at 16:00

## 2020-02-27 RX ADMIN — Medication SCH DOSE: at 20:23

## 2020-02-27 RX ADMIN — NICOTINE SCH PATCH: 21 PATCH, EXTENDED RELEASE TRANSDERMAL at 09:00

## 2020-02-27 RX ADMIN — Medication SCH DOSE: at 09:00

## 2020-02-27 NOTE — MHIPNPDOC
[facility] Progress Note


Progress Note





Rik Pringle





Inpatient Progress Note


Rik Pringle


Select Gender


MRN: N/[id number]


Date of Birth: [birth year]YYYY


Date of Service: 02/27/2020


History of Present Illness


Patient presented catatonic with a history of schizophrenia and significant 

mental health transfer from home.





Interval History


Narrative: The patient is met with in the group setting. He is brighter and has 

better hygiene, more interactive than previous, able to engage in more thorough 

interview. He is much more relatable, with good hygiene and has made strong 

improvements today.





Affective: The patient grossly denies.





Psychotic: The patient is much less disorganized and able to attend to his own 

needs.





Anxiety: Denies any.





Eating and sleeping behaviors: Normalizing.





Group Attendance: Frequent with more groups attended than yesterday.





Medication Side effects: See ROS below.





Behavioral problems/significant events overnight: None reported.





Staff Report: The patient has been normalizing, resuming care for himself, 

needing very little engagement.





Review Of Systems


General: Denies fever or appetite changes 





Cardiovascular: Denies Chest pain or palpitations





GI: Denies Nausea, vomiting, or bowel changes





Respiratory: Denies shortness of breath or cough





Neuro: Denies dizziness, tremors





Derm: Denies any rashes or pruritus





: Denies any dysuria or urinary problems 





MSK: Denies any muscle tightness or stiffness





HEENT: Denies any vision changes or headaches





Psychotherapy


None on this visit.





Vital Signs


Reviewed.





Mental Status Examination


General: Much improved hygiene.





Speech: More fluid.





Thought processes: Linear and logical





MSK:  No major signs of involuntary movements beyond some back movements that 

appear improved.





Thought content: Future orientated





Abstract reasoning, and computation: Intact





Description of associations: Intact





Description of abnormal or psychotic thoughts: Denies any suicidal or homicidal 

ideation. Denies any auditory or visual hallucinations. Does not appear to be 

responding to internal stimuli. Does not appear to be endorsing any bizarre or 

paranoid ideation.





Judgment: Improved.





Insight: Improved.





Orientation: Alert and orientated 3





Cognition: Grossly normal





Recent and remote memory: Intact





Attention span and concentration: Intact





Fund of knowledge: Adequate





Mood: "okay"





Affect: More bright and euthymic.





Diagnoses


Schizophrenia.





Catatonia.





Alcohol use disorder, unspecified.





Rash, unspecified.





Assessment and Plan


Schizophrenia: Continue Abilify 20 mg daily.





Catatonia: Resolved.





Alcohol use disorder: Not relevant at this time.





Rash: Resolved.





Disposition


Patient will be discharged once weather clears, currently travel advisory 

prevents us from sending patient back to brother's home, coordinated with 

brother for patient to return home, contention upon continuing to take his 

medications.





Time Spent


15 minutes.





Vital Signs





Vital Signs








  Date Time  Temp Pulse Resp B/P (MAP) Pulse Ox O2 Delivery O2 Flow Rate FiO2


 


2/26/20 16:00 98.6 80 16 137/83 (101)    


 


2/26/20 08:11      Room Air  











Current Medications





Current Medications








 Medications


  (Trade)  Dose


 Ordered  Sig/Nimo


 Route


 PRN Reason  Start Time


 Stop Time Status Last Admin


Dose Admin


 


 Acetaminophen


  (Tylenol Tab)  650 mg  Q6HP  PRN


 PO


 HEADACHE or DISCOMFORT  1/19/20 15:15


     





 


 Al Hydrox/Mg


 Hydrox/Simethicone


  (Mylanta)  30 ml  Q4HP  PRN


 PO


 HEARTBURN/INDIGESTION  1/19/20 15:15


     





 


 Amlodipine


 Besylate


  (Norvasc)  2.5 mg  DAILY


 PO


   1/20/20 09:00


 1/21/20 08:37 DC 1/21/20 08:30





 


 Amlodipine


 Besylate


  (Norvasc)  5 mg  DAILY


 PO


   1/22/20 09:00


 1/28/20 15:46 DC 1/27/20 09:30





 


 Amlodipine


 Besylate


  (Norvasc)  10 mg  DAILY


 PO


   1/29/20 09:00


    2/26/20 09:21





 


 Aripiprazole


  (AbiLIFY)  5 mg  DAILY


 PO


   2/18/20 09:00


 2/19/20 08:51 DC 2/19/20 08:38





 


 Aripiprazole


  (AbiLIFY)  5 mg  QHS


 PO


   1/27/20 21:00


 2/18/20 10:50 DC  





 


 Aripiprazole


  (AbiLIFY)  10 mg  DAILY


 PO


   2/20/20 09:00


 2/21/20 13:38 DC 2/21/20 09:37





 


 Aripiprazole


  (AbiLIFY)  15 mg  DAILY


 PO


   2/22/20 09:00


 2/24/20 09:04 DC 2/23/20 09:31





 


 Aripiprazole


  (AbiLIFY)  20 mg  DAILY


 PO


   2/24/20 09:00


    2/26/20 09:20





 


 Benztropine


 Mesylate


  (Cogentin)  0.25 mg  Q6HP  PRN


 PO


 eps  2/18/20 13:00


     





 


 Clozapine


  (Clozaril)  25 mg  QHS


 PO


   1/22/20 21:00


 1/23/20 12:16 DC 1/22/20 21:33





 


 Emollient Cream


  (Vanicream)  APPLY TO


 AFFECTED


 AREAS  TID


 TOP


   2/3/20 21:00


    2/26/20 15:32





 


 Haloperidol


  (Haldol)  10 mg  DAILY  PRN


 IM


 REFUSES ORAL ABILIFY  2/18/20 11:30


    2/18/20 11:47





 


 Haloperidol


  (Haldol)  10 mg  QHS  PRN


 IM


   2/18/20 11:00


 2/18/20 11:17 DC  





 


 Home Med


  (Med Rec


 Complete!)    ASDIRECTED


 XX


   1/19/20 12:45


 1/19/20 12:44 DC  





 


 Lorazepam


  (Ativan)  0.5 mg  TID


 PO


   1/20/20 16:00


 1/20/20 13:08 DC  





 


 Lorazepam


  (Ativan)  1 mg  Q6H


 PO


   1/19/20 22:00


 1/20/20 13:09 DC 1/20/20 10:33





 


 Lorazepam


  (Ativan)  1 mg  Q6H


 PO


   1/20/20 18:00


 1/22/20 08:26 DC 1/22/20 05:51





 


 Lorazepam


  (Ativan)  1 mg  TID


 PO


   1/22/20 16:00


 2/9/20 14:51 DC 1/27/20 16:13





 


 Magnesium


 Hydroxide


  (Milk Of


 Magnesia)  30 ml  DAILYPRN  PRN


 PO


 CONSTIPATION  1/19/20 15:15


     





 


 Miscellaneous


  (Unresolved


 Clarification


 Entry)  SEE LABEL


 COMMENTS  DAILY


 XX


   2/9/20 09:00


 2/9/20 14:53 DC  





 


 Nicotine


  (Nicoderm Cq


 21mg)  1 patch  DAILY


 TD


   1/20/20 09:00


     





 


 Non-Formulary


 Medication


  (** See Comment


 Field Below **)  SEE


 COMMENTS


 SECTION  1T@10


 XX


   1/29/20 10:00


 1/27/20 10:37 DC  





 


 Non-Formulary


 Medication


  (** See Comment


 Field Below **)  SEE LABEL


 COMMENTS  DAILY


 XX


   1/27/20 09:00


     





 


 Olanzapine


  (ZyPREXA


 **ZYDIS**)  10 mg  TIDP  PRN


 PO


 ANXIETY/AGITATION  1/19/20 15:00


 1/21/20 15:16 DC 1/19/20 21:39





 


 Paliperidone


  (Invega)  3 mg  BID


 PO


   1/19/20 21:00


 1/20/20 12:58 DC 1/20/20 09:12





 


 Trazodone HCl


  (Desyrel)  50 mg  QHSP  PRN


 PO


 INSOMNIA  1/19/20 21:00


    1/19/20 21:39














Allergies


Coded Allergies:  


     No Known Allergies (Unverified , 1/19/20)











FRANCESCO GERMAIN DO              Feb 27, 2020 08:47

## 2020-02-28 VITALS — SYSTOLIC BLOOD PRESSURE: 129 MMHG | DIASTOLIC BLOOD PRESSURE: 84 MMHG

## 2020-02-28 RX ADMIN — Medication SCH DOSE: at 16:00

## 2020-02-28 RX ADMIN — NICOTINE SCH PATCH: 21 PATCH, EXTENDED RELEASE TRANSDERMAL at 09:00

## 2020-02-28 RX ADMIN — Medication SCH DOSE: at 20:30

## 2020-02-28 RX ADMIN — Medication SCH DOSE: at 09:00

## 2020-02-28 RX ADMIN — Medication SCH: at 09:00

## 2020-02-28 NOTE — MHIPNPDOC
[facility] Progress Note


Progress Note


DATE OF SERVICE: 2/28/20





HISTORY: .





VITAL SIGNS: See below.





NEW TEST RESULTS: .





CURRENT MEDICATIONS: See below.





MENTAL STATUS EXAMINATION:


Patient is a -year old male, who is .


Speech: Is .


Language skills are .


Thought processes including: .


Thought content: . Abstract reasoning, and computation: . Description of associ

ations: .


Description of abnormal or psychotic thoughts: .


Judgment: .


Insight: [very limited, good, fair. poor].


Orientation: .


Recent and remote memory: .


Attention span and concentration: .


Language: .


Fund of knowledge: .


Mood: . Affect: .





DIAGNOSES:


1. .


2. .


3. .


 


ASSESSMENT:





MANAGEMENT PLAN: .





TIME SPENT:  minutes.





Vital Signs





Vital Signs








  Date Time  Temp Pulse Resp B/P (MAP) Pulse Ox O2 Delivery O2 Flow Rate FiO2


 


2/27/20 18:01 100.0 59 18 114/77 (89)    


 


2/26/20 08:11      Room Air  











Current Medications





Current Medications








 Medications


  (Trade)  Dose


 Ordered  Sig/Nimo


 Route


 PRN Reason  Start Time


 Stop Time Status Last Admin


Dose Admin


 


 Acetaminophen


  (Tylenol Tab)  650 mg  Q6HP  PRN


 PO


 HEADACHE or DISCOMFORT  1/19/20 15:15


     





 


 Al Hydrox/Mg


 Hydrox/Simethicone


  (Mylanta)  30 ml  Q4HP  PRN


 PO


 HEARTBURN/INDIGESTION  1/19/20 15:15


     





 


 Amlodipine


 Besylate


  (Norvasc)  2.5 mg  DAILY


 PO


   1/20/20 09:00


 1/21/20 08:37 DC 1/21/20 08:30





 


 Amlodipine


 Besylate


  (Norvasc)  5 mg  DAILY


 PO


   1/22/20 09:00


 1/28/20 15:46 DC 1/27/20 09:30





 


 Amlodipine


 Besylate


  (Norvasc)  10 mg  DAILY


 PO


   1/29/20 09:00


    2/26/20 09:21





 


 Aripiprazole


  (AbiLIFY)  5 mg  DAILY


 PO


   2/18/20 09:00


 2/19/20 08:51 DC 2/19/20 08:38





 


 Aripiprazole


  (AbiLIFY)  5 mg  QHS


 PO


   1/27/20 21:00


 2/18/20 10:50 DC  





 


 Aripiprazole


  (AbiLIFY)  10 mg  DAILY


 PO


   2/20/20 09:00


 2/21/20 13:38 DC 2/21/20 09:37





 


 Aripiprazole


  (AbiLIFY)  15 mg  DAILY


 PO


   2/22/20 09:00


 2/24/20 09:04 DC 2/23/20 09:31





 


 Aripiprazole


  (AbiLIFY)  20 mg  DAILY


 PO


   2/24/20 09:00


    2/27/20 09:14





 


 Benztropine


 Mesylate


  (Cogentin)  0.25 mg  Q6HP  PRN


 PO


 eps  2/18/20 13:00


     





 


 Clozapine


  (Clozaril)  25 mg  QHS


 PO


   1/22/20 21:00


 1/23/20 12:16 DC 1/22/20 21:33





 


 Emollient Cream


  (Vanicream)  APPLY TO


 AFFECTED


 AREAS  TID


 TOP


   2/3/20 21:00


    2/26/20 15:32





 


 Haloperidol


  (Haldol)  10 mg  DAILY  PRN


 IM


 REFUSES ORAL ABILIFY  2/18/20 11:30


    2/18/20 11:47





 


 Haloperidol


  (Haldol)  10 mg  QHS  PRN


 IM


   2/18/20 11:00


 2/18/20 11:17 DC  





 


 Home Med


  (Med Rec


 Complete!)    ASDIRECTED


 XX


   1/19/20 12:45


 1/19/20 12:44 DC  





 


 Lorazepam


  (Ativan)  0.5 mg  TID


 PO


   1/20/20 16:00


 1/20/20 13:08 DC  





 


 Lorazepam


  (Ativan)  1 mg  Q6H


 PO


   1/19/20 22:00


 1/20/20 13:09 DC 1/20/20 10:33





 


 Lorazepam


  (Ativan)  1 mg  Q6H


 PO


   1/20/20 18:00


 1/22/20 08:26 DC 1/22/20 05:51





 


 Lorazepam


  (Ativan)  1 mg  TID


 PO


   1/22/20 16:00


 2/9/20 14:51 DC 1/27/20 16:13





 


 Magnesium


 Hydroxide


  (Milk Of


 Magnesia)  30 ml  DAILYPRN  PRN


 PO


 CONSTIPATION  1/19/20 15:15


     





 


 Miscellaneous


  (Unresolved


 Clarification


 Entry)  SEE LABEL


 COMMENTS  DAILY


 XX


   2/9/20 09:00


 2/9/20 14:53 DC  





 


 Nicotine


  (Nicoderm Cq


 21mg)  1 patch  DAILY


 TD


   1/20/20 09:00


     





 


 Non-Formulary


 Medication


  (** See Comment


 Field Below **)  SEE


 COMMENTS


 SECTION  1T@10


 XX


   1/29/20 10:00


 1/27/20 10:37 DC  





 


 Non-Formulary


 Medication


  (** See Comment


 Field Below **)  SEE LABEL


 COMMENTS  DAILY


 XX


   1/27/20 09:00


     





 


 Olanzapine


  (ZyPREXA


 **ZYDIS**)  10 mg  TIDP  PRN


 PO


 ANXIETY/AGITATION  1/19/20 15:00


 1/21/20 15:16 DC 1/19/20 21:39





 


 Paliperidone


  (Invega)  3 mg  BID


 PO


   1/19/20 21:00


 1/20/20 12:58 DC 1/20/20 09:12





 


 Trazodone HCl


  (Desyrel)  50 mg  QHSP  PRN


 PO


 INSOMNIA  1/19/20 21:00


    1/19/20 21:39














Allergies


Coded Allergies:  


     No Known Allergies (Unverified , 1/19/20)











FRANCESCO GERMAIN DO              Feb 28, 2020 09:13

## 2020-02-28 NOTE — MHDSPDOC
[facility] Discharge Summary


Discharge Summary


DATE OF ADMISSION: Jan 19, 2020 at 15:08 


DATE OF DISCHARGE: 2/29/20





Rik Pringle





Discharge


Rik Pringle


Select Gender


MRN: N/[id number]


Date of Birth: [birth year]


Date of Service: 02/28/2020


Diagnoses





Schizophrenia.


Catatonia.


Alcohol use disorder, unspecified.


Rash, unspecified.


History of Present Illness





Patient presented catatonic with a history of schizophrenia and significant 

mental health transfer from home.


Consultants Involved


Hospitalist/PCP screening


Treatment and Progress On The Unit


The patient was admitted to the inpatient mental health unit somewhat catatonic,

refusing to eat and generally not engaging with any attempts at getting him to 

do ADLs. He was bizarre and would only state the statement over "I want to 

live." The patient was initially open to taking medications such as Ativan and 

Clozaril, however, he quickly refused. The patient was admitted to the inpatient

mental health unit after being transferred from [place], he reportedly had been 

found starving in a basement. During our initial observations he had no self 

care, would not eat or drink and was needing prompting even the most basic care.

The patient initially took Ativan which improved him somewhat, Clozaril was 

tried but he did not tolerate it well. The patient subsequently refused all 

medications and then was able to eat when food was directly offered to him, 

however, he was orderly walked around the unit stating 5 phrases over and over, 

with no variation thereof. The patient generally was bizarre and refused 

medications, he was taken to court and treatment of objection was ordered. The 

patient was treated with Abilify up to 20 mg daily which produced profound 

effects, the patient began taking care of himself, showered for the first time 

in nearly a month and fed himself, he became friendly and amenable, speaking 

normally without any hesitation and the signs of psychosis resolved quickly. He 

was given an injectable Abilify Maintena 400 mg every month, that will need to 

be continued just prior to his discharge. The patient made strong strides and 

eventually we were able to get into contact with his brother, who allowed the 

patient to return to live with him contention upon taking his medication. We 

gave the brother the information for the AOT program as it appears that the 

patient would benefit greatly from this as he does very well with medications. 

The patient then was triaged for discharge, however, due to inclement weather 

the patient was unable to leave, however, arrangements were made to send the 

patient the next day once the weather had cleared in a [employer] cab. The patient

did well and returned to a normal mental status exam. He was thankful and 

friendly on his last evaluation with me. He will be allowed to be discharged if 

he does not demonstrate any concerning behavior prior to discharge.


Discharge Assessment


The patient a 45-year-old man with a long history of schizophrenia presents 

psychotic and catatonic, after he is treated with Abilify he returns to a normal

mental status exam, has good insight into the situation and takes care of 

himself well. He never had any suicidal or homicidal ideation and generally 

denied any auditory or visual hallucinations, but was fairly disorganized. The 

patient on the final assessment prior to discharge did not meet involuntary 

criteria as he had a normal mental status exam, was well medicated and had a 

sufficient plan for when he returned home. A safe discharge was arranged and the

patient was cooperative with this process. He declined further voluntary 

admission and thus was discharged to [facility] after we had educated the family

on potential alternatives to help continuous stability. His long-acting 

injectable will likely ensure at least a month or more of stability if he stops 

taking his medications. This appears to have been a pattern for this individual 

from his previous hospitalizations, although he has declined for us to look at 

those records.


Mental Status Examination


General: Well dressed with good hygiene


Speech: Spontaneous and fluid


Thought processes: Linear and logical


MSK: Smooth and coordinated gait, no signs of tremors or involuntary orofacial 

movements


Thought content: Future orientated


Abstract reasoning, and computation: Intact


Description of associations: Intact


Description of abnormal or psychotic thoughts: Denies any suicidal or homicidal 

ideation. Denies any auditory or visual hallucinations. Does not appear to be 

responding to internal stimuli. Does not appear to be endorsing any bizarre or 

paranoid ideation.


Judgment: fair


Insight: fair


Orientation: Alert and orientated 3


Cognition: Grossly normal


Recent and remote memory: Intact


Attention span and concentration: Intact


Fund of knowledge: Adequate


Mood: "okay"


Affect: Euthymic with a full range


Follow Up








The social work team worked during the predischarge meeting in order to evaluate

for further issues of lethality address them fully before discharge. They worked

on safety planning with the patient's family members in order to ensure that the

patient will have a safe and effective discharge.


Time Spent


The amount of time spent in the coordination of care for this patient was 

approximately 60 minutes.





Vital Signs/I&Os





Vital Signs








  Date Time  Temp Pulse Resp B/P (MAP) Pulse Ox O2 Delivery O2 Flow Rate FiO2


 


2/28/20 10:04  68  129/84    


 


2/27/20 18:01 100.0  18     


 


2/26/20 08:11      Room Air  











Medications


Scheduled


Amlodipine Besylate (Amlodipine Besylate) 10 Mg Tablet, 10 MG PO DAILY for htn 

for 7 Days, #7


Aripiprazole (Aripiprazole) 20 Mg Tablet, 20 MG PO DAILY for thoughts for 7 

Days, #7


Aripiprazole (Abilify Maintena) 400 Mg Suser.syr, 400 MG IM Q4WKS for thought 

for 28 Days, #400


Nicotine (Nicotine Patch) 21 Mg Patch.td24, 1 PATCH TD DAILY for tobacco for 30 

Days, #30





Scheduled PRN


Benztropine Mesylate (Benztropine Mesylate) 0.5 Mg Tablet, 0.25 MG PO Q6HP PRN 

for eps for 7 Days, #7





Allergies


Coded Allergies:  


     No Known Allergies (Unverified , 1/19/20)











FRANCESCO GERMAIN DO              Feb 28, 2020 12:29

## 2020-02-29 VITALS — DIASTOLIC BLOOD PRESSURE: 56 MMHG | SYSTOLIC BLOOD PRESSURE: 117 MMHG

## 2020-02-29 RX ADMIN — Medication SCH: at 09:00

## 2020-02-29 RX ADMIN — NICOTINE SCH PATCH: 21 PATCH, EXTENDED RELEASE TRANSDERMAL at 08:36

## 2020-02-29 RX ADMIN — Medication SCH DOSE: at 09:00

## 2021-11-01 NOTE — MHIPNPDOC
[facility] Progress Note


Progress Note








Inpatient Progress Note


Rik Pringle


MRN: N/A


Date of Birth: N/A


Date of Service: 02/12/2020


History of Present Illness


Patient presented catatonic with a history of schizophrenia and significant 

mental health transfer from home.





Interval History


Narrative: The patient continues to walk around the unit, bizarre, very 

difficult to talk to, repetitive.





Affective: Unknown.





Psychotic: Still disorganized, tangential and bizarre.





Anxiety: Unable to determine.





Eating and sleeping behaviors: Still disrupted, needs significant prompting for 

eating.





Group Attendance: None.





Medication Side effects: See ROS below.





Behavioral problems/significant events overnight: None reported.





Staff Report: Patient is still disorganized, walking around, asking to various 

passerby the same question in repeating loop.





Review Of Systems


Unable to determine due to patient's mental status.





Psychotherapy


None on this visit.





Vital Signs


Reviewed.





Mental Status Examination


General: Poor hygiene





Speech: Significantly slowed, repeats same phrase over and over





Thought processes: Appears linear at times





MSK: Psychomotor retardation





Thought content: Unknown





Abstract reasoning, and computation: Appears impaired





Description of associations: Appears impaired





Description of abnormal or psychotic thoughts: Unknown





Judgment: impaired





Insight: impaired





Orientation: Appears alert and able to interact





Cognition: Slowed





Recent and remote memory: Impaired





Attention span and concentration: Impaired





Fund of knowledge: Unknown





Mood: "I want to go home, I'm perfectly fine."





Affect: Profoundly flat and dysthymic





Diagnoses


Schizophrenia.





Catatonia.





Alcohol use disorder, unspecified.





Rash, unspecified.





Assessment and Plan


Schizophrenia: Continue to offer Abilify 10 mg nightly, patient not taking 

medications.





Catatonia: Continue Ativan, patient is able to feed.





Alcohol use disorder: Will continue Ativan for catatonia. Continue to screen for

any dysregulation consistent with alcohol withdrawal, as unknown amount of 

alcohol use prior.





Treatment over objection assessment completed. Patient is slated for evaluation 

today for second opinion. Treatment over objection has been completed for 

secondary evaluation. Patient's court date assigned for next Friday. 





Rash: Patient noncompliance. 





Disposition


Patient will be taken to court this coming Friday for further disposition and 

potential treatment over objection.





Time Spent


15 minutes.





Wednesday





Vital Signs





Vital Signs








  Date Time  Temp Pulse Resp B/P (MAP) Pulse Ox O2 Delivery O2 Flow Rate FiO2


 


2/9/20 09:30      Room Air  











Current Medications





Current Medications








 Medications


  (Trade)  Dose


 Ordered  Sig/Nimo


 Route


 PRN Reason  Start Time


 Stop Time Status Last Admin


Dose Admin


 


 Acetaminophen


  (Tylenol Tab)  650 mg  Q6HP  PRN


 PO


 HEADACHE or DISCOMFORT  1/19/20 15:15


     





 


 Al Hydrox/Mg


 Hydrox/Simethicone


  (Mylanta)  30 ml  Q4HP  PRN


 PO


 HEARTBURN/INDIGESTION  1/19/20 15:15


     





 


 Amlodipine


 Besylate


  (Norvasc)  2.5 mg  DAILY


 PO


   1/20/20 09:00


 1/21/20 08:37 DC 1/21/20 08:30





 


 Amlodipine


 Besylate


  (Norvasc)  5 mg  DAILY


 PO


   1/22/20 09:00


 1/28/20 15:46 DC 1/27/20 09:30





 


 Amlodipine


 Besylate


  (Norvasc)  10 mg  DAILY


 PO


   1/29/20 09:00


     





 


 Aripiprazole


  (AbiLIFY)  5 mg  QHS


 PO


   1/27/20 21:00


     





 


 Clozapine


  (Clozaril)  25 mg  QHS


 PO


   1/22/20 21:00


 1/23/20 12:16 DC 1/22/20 21:33





 


 Emollient Cream


  (Vanicream)  APPLY TO


 AFFECTED


 AREAS  TID


 TOP


   2/3/20 21:00


     





 


 Home Med


  (Med Rec


 Complete!)    ASDIRECTED


 XX


   1/19/20 12:45


 1/19/20 12:44 DC  





 


 Lorazepam


  (Ativan)  0.5 mg  TID


 PO


   1/20/20 16:00


 1/20/20 13:08 DC  





 


 Lorazepam


  (Ativan)  1 mg  Q6H


 PO


   1/19/20 22:00


 1/20/20 13:09 DC 1/20/20 10:33





 


 Lorazepam


  (Ativan)  1 mg  Q6H


 PO


   1/20/20 18:00


 1/22/20 08:26 DC 1/22/20 05:51





 


 Lorazepam


  (Ativan)  1 mg  TID


 PO


   1/22/20 16:00


 2/9/20 14:51 DC 1/27/20 16:13





 


 Magnesium


 Hydroxide


  (Milk Of


 Magnesia)  30 ml  DAILYPRN  PRN


 PO


 CONSTIPATION  1/19/20 15:15


     





 


 Miscellaneous


  (Unresolved


 Clarification


 Entry)  SEE LABEL


 COMMENTS  DAILY


 XX


   2/9/20 09:00


 2/9/20 14:53 DC  





 


 Nicotine


  (Nicoderm Cq


 21mg)  1 patch  DAILY


 TD


   1/20/20 09:00


     





 


 Non-Formulary


 Medication


  (** See Comment


 Field Below **)  SEE


 COMMENTS


 SECTION  1T@10


 XX


   1/29/20 10:00


 1/27/20 10:37 DC  





 


 Non-Formulary


 Medication


  (** See Comment


 Field Below **)  SEE LABEL


 COMMENTS  DAILY


 XX


   1/27/20 09:00


     





 


 Olanzapine


  (ZyPREXA


 **ZYDIS**)  10 mg  TIDP  PRN


 PO


 ANXIETY/AGITATION  1/19/20 15:00


 1/21/20 15:16 DC 1/19/20 21:39





 


 Paliperidone


  (Invega)  3 mg  BID


 PO


   1/19/20 21:00


 1/20/20 12:58 DC 1/20/20 09:12





 


 Trazodone HCl


  (Desyrel)  50 mg  QHSP  PRN


 PO


 INSOMNIA  1/19/20 21:00


    1/19/20 21:39














Allergies


Coded Allergies:  


     No Known Allergies (Unverified , 1/19/20)











FRANCESCO GERMAIN DO              Feb 12, 2020 11:48 not examined